# Patient Record
Sex: FEMALE | Race: WHITE | Employment: OTHER | URBAN - METROPOLITAN AREA
[De-identification: names, ages, dates, MRNs, and addresses within clinical notes are randomized per-mention and may not be internally consistent; named-entity substitution may affect disease eponyms.]

---

## 2018-05-03 ENCOUNTER — HOSPITAL ENCOUNTER (OUTPATIENT)
Dept: CARDIAC CATH/INVASIVE PROCEDURES | Age: 83
Discharge: HOME | End: 2018-05-04
Attending: INTERNAL MEDICINE | Admitting: INTERNAL MEDICINE
Payer: MEDICARE

## 2018-05-03 DIAGNOSIS — I25.10 CAD IN NATIVE ARTERY: ICD-10-CM

## 2018-05-03 PROBLEM — I10 HTN (HYPERTENSION), BENIGN: Chronic | Status: ACTIVE | Noted: 2018-05-03

## 2018-05-03 PROBLEM — J44.9 COPD (CHRONIC OBSTRUCTIVE PULMONARY DISEASE) (HCC): Chronic | Status: ACTIVE | Noted: 2018-05-03

## 2018-05-03 PROBLEM — E78.2 MIXED HYPERLIPIDEMIA: Chronic | Status: ACTIVE | Noted: 2018-05-03

## 2018-05-03 LAB
ABO/RH: NORMAL
ANTIBODY SCREEN: NORMAL

## 2018-05-03 PROCEDURE — 6360000002 HC RX W HCPCS

## 2018-05-03 PROCEDURE — 2709999900 HC NON-CHARGEABLE SUPPLY

## 2018-05-03 PROCEDURE — 2580000003 HC RX 258: Performed by: INTERNAL MEDICINE

## 2018-05-03 PROCEDURE — C1769 GUIDE WIRE: HCPCS

## 2018-05-03 PROCEDURE — 86901 BLOOD TYPING SEROLOGIC RH(D): CPT

## 2018-05-03 PROCEDURE — 2500000003 HC RX 250 WO HCPCS

## 2018-05-03 PROCEDURE — 36415 COLL VENOUS BLD VENIPUNCTURE: CPT

## 2018-05-03 PROCEDURE — 6370000000 HC RX 637 (ALT 250 FOR IP): Performed by: INTERNAL MEDICINE

## 2018-05-03 PROCEDURE — C1894 INTRO/SHEATH, NON-LASER: HCPCS

## 2018-05-03 PROCEDURE — 6370000000 HC RX 637 (ALT 250 FOR IP)

## 2018-05-03 PROCEDURE — C1874 STENT, COATED/COV W/DEL SYS: HCPCS

## 2018-05-03 PROCEDURE — C9600 PERC DRUG-EL COR STENT SING: HCPCS | Performed by: INTERNAL MEDICINE

## 2018-05-03 PROCEDURE — C1725 CATH, TRANSLUMIN NON-LASER: HCPCS

## 2018-05-03 PROCEDURE — 86900 BLOOD TYPING SEROLOGIC ABO: CPT

## 2018-05-03 PROCEDURE — 93458 L HRT ARTERY/VENTRICLE ANGIO: CPT | Performed by: INTERNAL MEDICINE

## 2018-05-03 PROCEDURE — 86850 RBC ANTIBODY SCREEN: CPT

## 2018-05-03 PROCEDURE — C1887 CATHETER, GUIDING: HCPCS

## 2018-05-03 RX ORDER — LEVOTHYROXINE SODIUM 88 UG/1
88 TABLET ORAL DAILY
COMMUNITY

## 2018-05-03 RX ORDER — SIMVASTATIN 20 MG
20 TABLET ORAL NIGHTLY
Status: ON HOLD | COMMUNITY
End: 2021-11-28

## 2018-05-03 RX ORDER — METOPROLOL SUCCINATE 25 MG/1
25 TABLET, EXTENDED RELEASE ORAL DAILY
Status: ON HOLD | COMMUNITY
End: 2022-07-20 | Stop reason: HOSPADM

## 2018-05-03 RX ORDER — CALCITRIOL 0.25 UG/1
0.25 CAPSULE, LIQUID FILLED ORAL DAILY
Status: DISCONTINUED | OUTPATIENT
Start: 2018-05-03 | End: 2018-05-04 | Stop reason: HOSPADM

## 2018-05-03 RX ORDER — METOPROLOL SUCCINATE 25 MG/1
25 TABLET, EXTENDED RELEASE ORAL 2 TIMES DAILY
Status: DISCONTINUED | OUTPATIENT
Start: 2018-05-03 | End: 2018-05-04 | Stop reason: HOSPADM

## 2018-05-03 RX ORDER — AMLODIPINE BESYLATE 5 MG/1
5 TABLET ORAL DAILY
COMMUNITY

## 2018-05-03 RX ORDER — SIMVASTATIN 20 MG
20 TABLET ORAL NIGHTLY
Status: DISCONTINUED | OUTPATIENT
Start: 2018-05-03 | End: 2018-05-04 | Stop reason: HOSPADM

## 2018-05-03 RX ORDER — ISOSORBIDE MONONITRATE 30 MG/1
30 TABLET, EXTENDED RELEASE ORAL DAILY
COMMUNITY

## 2018-05-03 RX ORDER — LORAZEPAM 0.5 MG/1
0.25 TABLET ORAL 2 TIMES DAILY
COMMUNITY

## 2018-05-03 RX ORDER — LORAZEPAM 0.5 MG/1
0.5 TABLET ORAL DAILY
Status: DISCONTINUED | OUTPATIENT
Start: 2018-05-03 | End: 2018-05-04 | Stop reason: HOSPADM

## 2018-05-03 RX ORDER — HYDRALAZINE HYDROCHLORIDE 20 MG/ML
10 INJECTION INTRAMUSCULAR; INTRAVENOUS EVERY 6 HOURS PRN
Status: DISCONTINUED | OUTPATIENT
Start: 2018-05-03 | End: 2018-05-04 | Stop reason: HOSPADM

## 2018-05-03 RX ORDER — CALCITRIOL 0.25 UG/1
0.25 CAPSULE, LIQUID FILLED ORAL DAILY
COMMUNITY

## 2018-05-03 RX ORDER — ASPIRIN 325 MG
325 TABLET ORAL DAILY
Status: DISCONTINUED | OUTPATIENT
Start: 2018-05-04 | End: 2018-05-04

## 2018-05-03 RX ORDER — ACETAMINOPHEN 325 MG/1
650 TABLET ORAL EVERY 4 HOURS PRN
Status: DISCONTINUED | OUTPATIENT
Start: 2018-05-03 | End: 2018-05-04 | Stop reason: HOSPADM

## 2018-05-03 RX ORDER — SODIUM CHLORIDE 0.9 % (FLUSH) 0.9 %
10 SYRINGE (ML) INJECTION EVERY 12 HOURS SCHEDULED
Status: DISCONTINUED | OUTPATIENT
Start: 2018-05-03 | End: 2018-05-04 | Stop reason: HOSPADM

## 2018-05-03 RX ORDER — GAUZE BANDAGE 4" X 4"
350 BANDAGE TOPICAL 3 TIMES DAILY
Status: DISCONTINUED | OUTPATIENT
Start: 2018-05-03 | End: 2018-05-03 | Stop reason: CLARIF

## 2018-05-03 RX ORDER — LOSARTAN POTASSIUM 50 MG/1
100 TABLET ORAL DAILY
Status: DISCONTINUED | OUTPATIENT
Start: 2018-05-04 | End: 2018-05-04 | Stop reason: HOSPADM

## 2018-05-03 RX ORDER — AMLODIPINE BESYLATE 5 MG/1
5 TABLET ORAL DAILY
Status: DISCONTINUED | OUTPATIENT
Start: 2018-05-04 | End: 2018-05-04 | Stop reason: HOSPADM

## 2018-05-03 RX ORDER — LOSARTAN POTASSIUM 100 MG/1
100 TABLET ORAL DAILY
Status: ON HOLD | COMMUNITY
End: 2022-07-20 | Stop reason: HOSPADM

## 2018-05-03 RX ORDER — SODIUM CHLORIDE 9 MG/ML
INJECTION, SOLUTION INTRAVENOUS ONCE
Status: COMPLETED | OUTPATIENT
Start: 2018-05-03 | End: 2018-05-03

## 2018-05-03 RX ORDER — MAGNESIUM OXIDE 400 MG/1
400 TABLET ORAL DAILY
COMMUNITY

## 2018-05-03 RX ORDER — ISOSORBIDE MONONITRATE 30 MG/1
30 TABLET, EXTENDED RELEASE ORAL DAILY
Status: DISCONTINUED | OUTPATIENT
Start: 2018-05-03 | End: 2018-05-04 | Stop reason: HOSPADM

## 2018-05-03 RX ORDER — M-VIT,TX,IRON,MINS/CALC/FOLIC 27MG-0.4MG
1 TABLET ORAL DAILY
COMMUNITY

## 2018-05-03 RX ORDER — ASPIRIN 325 MG
325 TABLET ORAL DAILY
Status: ON HOLD | COMMUNITY
End: 2018-05-04 | Stop reason: HOSPADM

## 2018-05-03 RX ORDER — CALCIUM CARBONATE 200(500)MG
1 TABLET,CHEWABLE ORAL 3 TIMES DAILY
Status: DISCONTINUED | OUTPATIENT
Start: 2018-05-03 | End: 2018-05-04 | Stop reason: HOSPADM

## 2018-05-03 RX ORDER — M-VIT,TX,IRON,MINS/CALC/FOLIC 27MG-0.4MG
1 TABLET ORAL DAILY
Status: DISCONTINUED | OUTPATIENT
Start: 2018-05-03 | End: 2018-05-04 | Stop reason: HOSPADM

## 2018-05-03 RX ORDER — PHENOL 1.4 %
1 AEROSOL, SPRAY (ML) MUCOUS MEMBRANE 3 TIMES DAILY
COMMUNITY

## 2018-05-03 RX ORDER — LEVOTHYROXINE SODIUM 88 UG/1
88 TABLET ORAL DAILY
Status: DISCONTINUED | OUTPATIENT
Start: 2018-05-04 | End: 2018-05-04 | Stop reason: HOSPADM

## 2018-05-03 RX ORDER — SODIUM CHLORIDE 0.9 % (FLUSH) 0.9 %
10 SYRINGE (ML) INJECTION PRN
Status: DISCONTINUED | OUTPATIENT
Start: 2018-05-03 | End: 2018-05-04 | Stop reason: HOSPADM

## 2018-05-03 RX ORDER — CITALOPRAM 20 MG/1
20 TABLET ORAL DAILY
COMMUNITY
End: 2018-05-03

## 2018-05-03 RX ORDER — GAUZE BANDAGE 4" X 4"
360 BANDAGE TOPICAL 3 TIMES DAILY
COMMUNITY

## 2018-05-03 RX ORDER — POTASSIUM CHLORIDE 1.5 G/1.77G
20 POWDER, FOR SOLUTION ORAL DAILY
Status: DISCONTINUED | OUTPATIENT
Start: 2018-05-03 | End: 2018-05-04 | Stop reason: HOSPADM

## 2018-05-03 RX ORDER — POTASSIUM CHLORIDE 1.5 G/1.77G
20 POWDER, FOR SOLUTION ORAL DAILY
Status: ON HOLD | COMMUNITY
End: 2021-11-29 | Stop reason: HOSPADM

## 2018-05-03 RX ORDER — OMEPRAZOLE 10 MG/1
10 CAPSULE, DELAYED RELEASE ORAL
Status: ON HOLD | COMMUNITY
End: 2021-11-28

## 2018-05-03 RX ORDER — LANOLIN ALCOHOL/MO/W.PET/CERES
400 CREAM (GRAM) TOPICAL DAILY
Status: DISCONTINUED | OUTPATIENT
Start: 2018-05-03 | End: 2018-05-04 | Stop reason: HOSPADM

## 2018-05-03 RX ADMIN — SODIUM CHLORIDE: 9 INJECTION, SOLUTION INTRAVENOUS at 10:15

## 2018-05-03 RX ADMIN — POTASSIUM CHLORIDE 20 MEQ: 1.5 POWDER, FOR SOLUTION ORAL at 15:41

## 2018-05-03 RX ADMIN — MAGNESIUM GLUCONATE 500 MG ORAL TABLET 400 MG: 500 TABLET ORAL at 15:41

## 2018-05-03 RX ADMIN — METOPROLOL SUCCINATE 25 MG: 25 TABLET, FILM COATED, EXTENDED RELEASE ORAL at 20:21

## 2018-05-03 RX ADMIN — LORAZEPAM 0.5 MG: 0.5 TABLET ORAL at 20:21

## 2018-05-03 RX ADMIN — CALCIUM CARBONATE (ANTACID) CHEW TAB 500 MG 500 MG: 500 CHEW TAB at 20:20

## 2018-05-03 RX ADMIN — Medication 10 ML: at 20:21

## 2018-05-03 RX ADMIN — ISOSORBIDE MONONITRATE 30 MG: 30 TABLET, EXTENDED RELEASE ORAL at 20:21

## 2018-05-03 RX ADMIN — SIMVASTATIN 20 MG: 20 TABLET, FILM COATED ORAL at 20:21

## 2018-05-03 RX ADMIN — TICAGRELOR 90 MG: 90 TABLET ORAL at 23:11

## 2018-05-03 RX ADMIN — CALCIUM CARBONATE (ANTACID) CHEW TAB 500 MG 500 MG: 500 CHEW TAB at 15:40

## 2018-05-03 RX ADMIN — CALCITRIOL 0.25 MCG: 0.25 CAPSULE, LIQUID FILLED ORAL at 15:41

## 2018-05-03 RX ADMIN — SODIUM CHLORIDE: 9 INJECTION, SOLUTION INTRAVENOUS at 10:14

## 2018-05-03 RX ADMIN — MULTIPLE VITAMINS W/ MINERALS TAB 1 TABLET: TAB at 15:41

## 2018-05-03 RX ADMIN — ACETAMINOPHEN 650 MG: 325 TABLET, FILM COATED ORAL at 17:23

## 2018-05-03 ASSESSMENT — PAIN SCALES - GENERAL
PAINLEVEL_OUTOF10: 5
PAINLEVEL_OUTOF10: 0
PAINLEVEL_OUTOF10: 1

## 2018-05-04 VITALS
RESPIRATION RATE: 16 BRPM | HEIGHT: 62 IN | TEMPERATURE: 98.3 F | HEART RATE: 71 BPM | WEIGHT: 147 LBS | BODY MASS INDEX: 27.05 KG/M2 | DIASTOLIC BLOOD PRESSURE: 86 MMHG | SYSTOLIC BLOOD PRESSURE: 125 MMHG | OXYGEN SATURATION: 96 %

## 2018-05-04 PROBLEM — Z95.5 STATUS POST INSERTION OF DRUG-ELUTING STENT INTO LEFT ANTERIOR DESCENDING (LAD) ARTERY: Status: ACTIVE | Noted: 2018-05-04

## 2018-05-04 LAB
ANION GAP SERPL CALCULATED.3IONS-SCNC: 16 MMOL/L (ref 7–16)
BUN BLDV-MCNC: 26 MG/DL (ref 8–23)
CALCIUM SERPL-MCNC: 8.8 MG/DL (ref 8.6–10.2)
CHLORIDE BLD-SCNC: 103 MMOL/L (ref 98–107)
CHOLESTEROL, TOTAL: 158 MG/DL (ref 0–199)
CO2: 23 MMOL/L (ref 22–29)
CREAT SERPL-MCNC: 0.9 MG/DL (ref 0.5–1)
GFR AFRICAN AMERICAN: >60
GFR NON-AFRICAN AMERICAN: 60 ML/MIN/1.73
GLUCOSE BLD-MCNC: 113 MG/DL (ref 74–109)
HCT VFR BLD CALC: 35.6 % (ref 34–48)
HDLC SERPL-MCNC: 60 MG/DL
HEMOGLOBIN: 12.1 G/DL (ref 11.5–15.5)
LDL CHOLESTEROL CALCULATED: 76 MG/DL (ref 0–99)
MCH RBC QN AUTO: 29.7 PG (ref 26–35)
MCHC RBC AUTO-ENTMCNC: 34 % (ref 32–34.5)
MCV RBC AUTO: 87.3 FL (ref 80–99.9)
PDW BLD-RTO: 13.8 FL (ref 11.5–15)
PLATELET # BLD: 228 E9/L (ref 130–450)
PMV BLD AUTO: 10.6 FL (ref 7–12)
POTASSIUM SERPL-SCNC: 4.1 MMOL/L (ref 3.5–5)
RBC # BLD: 4.08 E12/L (ref 3.5–5.5)
SODIUM BLD-SCNC: 142 MMOL/L (ref 132–146)
TRIGL SERPL-MCNC: 112 MG/DL (ref 0–149)
VLDLC SERPL CALC-MCNC: 22 MG/DL
WBC # BLD: 7.3 E9/L (ref 4.5–11.5)

## 2018-05-04 PROCEDURE — 80061 LIPID PANEL: CPT

## 2018-05-04 PROCEDURE — 6370000000 HC RX 637 (ALT 250 FOR IP)

## 2018-05-04 PROCEDURE — 36415 COLL VENOUS BLD VENIPUNCTURE: CPT

## 2018-05-04 PROCEDURE — 2580000003 HC RX 258: Performed by: INTERNAL MEDICINE

## 2018-05-04 PROCEDURE — 85027 COMPLETE CBC AUTOMATED: CPT

## 2018-05-04 PROCEDURE — 6370000000 HC RX 637 (ALT 250 FOR IP): Performed by: INTERNAL MEDICINE

## 2018-05-04 PROCEDURE — 80048 BASIC METABOLIC PNL TOTAL CA: CPT

## 2018-05-04 RX ORDER — ASPIRIN 81 MG/1
TABLET, CHEWABLE ORAL
Status: COMPLETED
Start: 2018-05-04 | End: 2018-05-04

## 2018-05-04 RX ORDER — ASPIRIN 81 MG/1
81 TABLET ORAL DAILY
Qty: 30 TABLET | Refills: 3 | Status: ON HOLD | OUTPATIENT
Start: 2018-05-04 | End: 2021-11-28

## 2018-05-04 RX ORDER — ASPIRIN 81 MG/1
81 TABLET ORAL DAILY
Status: DISCONTINUED | OUTPATIENT
Start: 2018-05-04 | End: 2018-05-04 | Stop reason: HOSPADM

## 2018-05-04 RX ADMIN — CALCITRIOL 0.25 MCG: 0.25 CAPSULE, LIQUID FILLED ORAL at 08:28

## 2018-05-04 RX ADMIN — MAGNESIUM GLUCONATE 500 MG ORAL TABLET 400 MG: 500 TABLET ORAL at 08:28

## 2018-05-04 RX ADMIN — LEVOTHYROXINE SODIUM 88 MCG: 88 TABLET ORAL at 06:31

## 2018-05-04 RX ADMIN — AMLODIPINE BESYLATE 5 MG: 5 TABLET ORAL at 08:27

## 2018-05-04 RX ADMIN — Medication 10 ML: at 08:28

## 2018-05-04 RX ADMIN — METOPROLOL SUCCINATE 25 MG: 25 TABLET, FILM COATED, EXTENDED RELEASE ORAL at 08:28

## 2018-05-04 RX ADMIN — TICAGRELOR 90 MG: 90 TABLET ORAL at 08:27

## 2018-05-04 RX ADMIN — ASPIRIN 81 MG 81 MG: 81 TABLET ORAL at 10:21

## 2018-05-04 RX ADMIN — ASPIRIN 81 MG: 81 TABLET, COATED ORAL at 10:22

## 2018-05-04 RX ADMIN — MULTIPLE VITAMINS W/ MINERALS TAB 1 TABLET: TAB at 08:27

## 2018-05-04 RX ADMIN — ISOSORBIDE MONONITRATE 30 MG: 30 TABLET, EXTENDED RELEASE ORAL at 08:27

## 2018-05-04 ASSESSMENT — PAIN SCALES - GENERAL
PAINLEVEL_OUTOF10: 0

## 2018-09-22 ENCOUNTER — APPOINTMENT (OUTPATIENT)
Dept: GENERAL RADIOLOGY | Age: 83
End: 2018-09-22
Payer: MEDICARE

## 2018-09-22 ENCOUNTER — HOSPITAL ENCOUNTER (EMERGENCY)
Age: 83
Discharge: HOME | End: 2018-09-22
Payer: MEDICARE

## 2018-09-22 VITALS
BODY MASS INDEX: 26.06 KG/M2 | OXYGEN SATURATION: 98 % | RESPIRATION RATE: 14 BRPM | HEART RATE: 82 BPM | TEMPERATURE: 98.4 F | WEIGHT: 138 LBS | DIASTOLIC BLOOD PRESSURE: 84 MMHG | SYSTOLIC BLOOD PRESSURE: 118 MMHG | HEIGHT: 61 IN

## 2018-09-22 DIAGNOSIS — M47.816 OSTEOARTHRITIS OF LUMBAR SPINE, UNSPECIFIED SPINAL OSTEOARTHRITIS COMPLICATION STATUS: Primary | ICD-10-CM

## 2018-09-22 DIAGNOSIS — M16.11 ARTHRITIS OF RIGHT HIP: ICD-10-CM

## 2018-09-22 PROCEDURE — 99283 EMERGENCY DEPT VISIT LOW MDM: CPT

## 2018-09-22 PROCEDURE — 6370000000 HC RX 637 (ALT 250 FOR IP): Performed by: PHYSICIAN ASSISTANT

## 2018-09-22 PROCEDURE — 72110 X-RAY EXAM L-2 SPINE 4/>VWS: CPT

## 2018-09-22 PROCEDURE — 73502 X-RAY EXAM HIP UNI 2-3 VIEWS: CPT

## 2018-09-22 RX ORDER — TIZANIDINE 4 MG/1
4 TABLET ORAL 2 TIMES DAILY PRN
Qty: 15 TABLET | Refills: 0 | Status: ON HOLD | OUTPATIENT
Start: 2018-09-22 | End: 2021-11-28

## 2018-09-22 RX ORDER — HYDROCODONE BITARTRATE AND ACETAMINOPHEN 5; 325 MG/1; MG/1
1 TABLET ORAL EVERY 6 HOURS PRN
Qty: 12 TABLET | Refills: 0 | Status: SHIPPED | OUTPATIENT
Start: 2018-09-22 | End: 2018-09-25

## 2018-09-22 RX ORDER — DIAZEPAM 5 MG/1
5 TABLET ORAL ONCE
Status: COMPLETED | OUTPATIENT
Start: 2018-09-22 | End: 2018-09-22

## 2018-09-22 RX ADMIN — DIAZEPAM 5 MG: 5 TABLET ORAL at 23:20

## 2018-09-22 ASSESSMENT — PAIN SCALES - GENERAL: PAINLEVEL_OUTOF10: 9

## 2018-09-22 ASSESSMENT — PAIN DESCRIPTION - LOCATION: LOCATION: LEG

## 2018-09-22 ASSESSMENT — PAIN DESCRIPTION - FREQUENCY: FREQUENCY: CONTINUOUS

## 2018-09-22 ASSESSMENT — PAIN DESCRIPTION - ORIENTATION: ORIENTATION: LEFT

## 2018-09-22 ASSESSMENT — PAIN DESCRIPTION - DESCRIPTORS: DESCRIPTORS: CONSTANT

## 2018-09-23 NOTE — ED PROVIDER NOTES
Independent:      HPI:  9/22/18, Time: 2046. Jefry Germain is a 80 y.o. female presenting to the ED for right hip and lower back pain worse over the last few days. She denies any recent falls, trauma or injury. The complaint has been persistent, moderate to severe in severity, and worsened by certain movements . She denies any falls, trauma or injury. She denies any loss or change of her bowel or bladder function. She has had no chest pain or SOB. ROS:   Pertinent positives and negatives are stated within the HPI, all other systems reviewed and are negative.    --------------------------------------------- PAST HISTORY ---------------------------------------------  Past Medical History:  has a past medical history of CAD (coronary artery disease); Cancer ([facility] [address] Emphysema lung ([facility] [address] Hyperlipidemia; Hypertension; and Thyroid disease. Past Surgical History:  has a past surgical history that includes Hysterectomy; Parathyroid gland surgery; Coronary angioplasty with stent (2004); and Coronary angioplasty with stent (05/03/2018). Social History:  reports that she has never smoked. She has never used smokeless tobacco.    Family History: family history is not on file. The patients home medications have been reviewed. Allergies: Patient has no known allergies. -------------------------------------------------- RESULTS -------------------------------------------------  All laboratory and radiology results have been personally reviewed by myself   LABS:  No results found for this visit on 09/22/18. RADIOLOGY:  Interpreted by Radiologist.  XR HIP RIGHT (2-3 VIEWS)   Final Result   Some degenerative changes around the right hip joint as   commented.       XR LUMBAR SPINE (MIN 4 VIEWS)   Final Result   Degenerative changes predominantly seen in the facet   joints of the lower lumbar spine segments of the L4-5 and L5-S1.          ------------------------- NURSING NOTES AND VITALS REVIEWED ---------------------------   The nursing notes within the ED encounter and vital signs as below have been reviewed. /84   Pulse 82   Temp 98.4 °F (36.9 °C)   Resp 14   Ht 5' 1\" (1.549 m)   Wt 138 lb (62.6 kg)   SpO2 98%   BMI 26.07 kg/m²   Oxygen Saturation Interpretation: Normal      ---------------------------------------------------PHYSICAL EXAM--------------------------------------      Constitutional/General: Alert and oriented x3, stable appearing, non toxic in NAD  Head: NC/AT  Eyes: PERRL, EOMI  Mouth: Oropharynx clear, handling secretions, no trismus  Neck: Supple, full ROM, non tender, no obvious spasm or stepoff. Pulmonary: Lungs clear to auscultation bilaterally, no wheezes, rales, or rhonchi. Not in respiratory distress  Cardiovascular:  Regular rate and rhythm,  2+ distal pulses  Abdomen: Soft, non tender, no palpable mass. BS active. No CVA tenderness, no flank hematoma. Extremities: Moves all extremities x 4. Negative SLR LE's bilaterally. Local tenderness to right lateral hip and right lower lumbar paravertebral area. Some palpable spasm. Warm and well perfused  Skin: warm and dry without rash  Neurologic: GCS 15  Psych: Normal Affect      ------------------------------ ED COURSE/MEDICAL DECISION MAKING----------------------  Medications   diazepam (VALIUM) tablet 5 mg (5 mg Oral Given 9/22/18 2320)       Medical Decision Making:    Patient to ER with complaints of right hip and lower back pain, worse over the last few days, no injury. Patient refused anything for pain on initial exam.  Patient awaiting xrays to be completed. Large volume in ER currently. On recheck, patient asking for something to drink, given glass of water. Patient still refusing med for pain. Patient agreed to something to help he muscles relax, dose of Valium given.     Patient advised of xray findings with DJD of lumbar spine with some facet joint disease as well as some arthritis of right hip.    Recommend Rx for Tizanidine as well as Norco for severe pain- hold tylenol if taking the Norco.    Need to see her PCP this week for possible physical therapy. She may need more advanced imaging if not improving. Counseling: The emergency provider has spoken with the patient and spouse/SO and discussed todays results, in addition to providing specific details for the plan of care and counseling regarding the diagnosis and prognosis. Questions are answered at this time and they are agreeable with the plan.      --------------------------------- IMPRESSION AND DISPOSITION ---------------------------------    IMPRESSION  1. Osteoarthritis of lumbar spine, unspecified spinal osteoarthritis complication status    2.  Arthritis of right hip        DISPOSITION  Disposition: Discharge to home  Patient condition is stable    Patient seen independently by Pavithra Longo PA-C  09/23/18 0601

## 2018-10-03 ENCOUNTER — HOSPITAL ENCOUNTER (EMERGENCY)
Age: 83
Discharge: HOME | End: 2018-10-04
Payer: MEDICARE

## 2018-10-03 ENCOUNTER — APPOINTMENT (OUTPATIENT)
Dept: CT IMAGING | Age: 83
End: 2018-10-03
Payer: MEDICARE

## 2018-10-03 DIAGNOSIS — S02.85XA CLOSED FRACTURE OF RIGHT ORBIT, INITIAL ENCOUNTER (HCC): ICD-10-CM

## 2018-10-03 DIAGNOSIS — S09.90XA INJURY OF HEAD, INITIAL ENCOUNTER: Primary | ICD-10-CM

## 2018-10-03 DIAGNOSIS — S16.1XXA STRAIN OF NECK MUSCLE, INITIAL ENCOUNTER: ICD-10-CM

## 2018-10-03 PROCEDURE — 99284 EMERGENCY DEPT VISIT MOD MDM: CPT

## 2018-10-03 PROCEDURE — 12011 RPR F/E/E/N/L/M 2.5 CM/<: CPT

## 2018-10-03 PROCEDURE — 72125 CT NECK SPINE W/O DYE: CPT

## 2018-10-03 PROCEDURE — 70450 CT HEAD/BRAIN W/O DYE: CPT

## 2018-10-03 PROCEDURE — 70486 CT MAXILLOFACIAL W/O DYE: CPT

## 2018-10-03 ASSESSMENT — PAIN DESCRIPTION - PAIN TYPE: TYPE: ACUTE PAIN

## 2018-10-03 ASSESSMENT — PAIN SCALES - GENERAL: PAINLEVEL_OUTOF10: 8

## 2018-10-03 ASSESSMENT — PAIN DESCRIPTION - LOCATION: LOCATION: HEAD

## 2018-10-04 VITALS
SYSTOLIC BLOOD PRESSURE: 179 MMHG | HEIGHT: 61 IN | HEART RATE: 82 BPM | RESPIRATION RATE: 20 BRPM | DIASTOLIC BLOOD PRESSURE: 76 MMHG | OXYGEN SATURATION: 96 % | TEMPERATURE: 98.5 F | BODY MASS INDEX: 26.24 KG/M2 | WEIGHT: 139 LBS

## 2018-10-04 PROCEDURE — 90471 IMMUNIZATION ADMIN: CPT | Performed by: PHYSICIAN ASSISTANT

## 2018-10-04 PROCEDURE — 6370000000 HC RX 637 (ALT 250 FOR IP): Performed by: PHYSICIAN ASSISTANT

## 2018-10-04 PROCEDURE — 90715 TDAP VACCINE 7 YRS/> IM: CPT | Performed by: PHYSICIAN ASSISTANT

## 2018-10-04 PROCEDURE — 6360000002 HC RX W HCPCS: Performed by: PHYSICIAN ASSISTANT

## 2018-10-04 RX ORDER — ACETAMINOPHEN 500 MG
1000 TABLET ORAL ONCE
Status: COMPLETED | OUTPATIENT
Start: 2018-10-04 | End: 2018-10-04

## 2018-10-04 RX ADMIN — TETANUS TOXOID, REDUCED DIPHTHERIA TOXOID AND ACELLULAR PERTUSSIS VACCINE, ADSORBED 0.5 ML: 5; 2.5; 8; 8; 2.5 SUSPENSION INTRAMUSCULAR at 01:43

## 2018-10-04 RX ADMIN — ACETAMINOPHEN 1000 MG: 500 TABLET ORAL at 01:43

## 2018-10-04 ASSESSMENT — VISUAL ACUITY
OS: 20/20
OU: 20/25
OD: 20/70

## 2018-10-04 ASSESSMENT — PAIN SCALES - GENERAL: PAINLEVEL_OUTOF10: 8

## 2018-10-04 NOTE — ED PROVIDER NOTES
Independent [facility]  HPI:  10/3/18, Time: 11:35 PM         Mary Jackson is a 80 y.o. female presenting to the ED for  Fall , beginning prior to arrival.  The complaint has been persistent, moderate in severity, and worsened by nothing. Patient comes in with complaint of fall. She gone downstairs on in the dark and lost her balance falling hitting her right eye on the corner of the handrail. She denies any loss of consciousness. She complains of pain around the right eye with blurring of vision. She presently on brilinta. She denies any neck pain no chest pain abdominal pain hip pain of her lower extremity pain. Review of Systems:   Pertinent positives and negatives are stated within HPI, all other systems reviewed and are negative.          --------------------------------------------- PAST HISTORY ---------------------------------------------  Past Medical History:  has a past medical history of CAD (coronary artery disease); Cancer ([facility] [address] Emphysema lung ([facility] [address] Hyperlipidemia; Hypertension; and Thyroid disease. Past Surgical History:  has a past surgical history that includes Hysterectomy; Parathyroid gland surgery; Coronary angioplasty with stent (2004); and Coronary angioplasty with stent (05/03/2018). Social History:  reports that she has never smoked. She has never used smokeless tobacco.    Family History: family history is not on file. The patients home medications have been reviewed. Allergies: Patient has no known allergies. -------------------------------------------------- RESULTS -------------------------------------------------  All laboratory and radiology results have been personally reviewed by myself   LABS:  No results found for this visit on 10/03/18. RADIOLOGY:  Interpreted by Radiologist.  CT Head WO Contrast   Final Result     No acute intracranial abnormality. Please refer to report for CT of the facial bones for additional details.       This report has been

## 2018-10-13 ENCOUNTER — HOSPITAL ENCOUNTER (EMERGENCY)
Age: 83
Discharge: LEFT BEFORE DISCHARGE | End: 2018-10-13
Attending: EMERGENCY MEDICINE
Payer: MEDICARE

## 2018-10-13 ENCOUNTER — APPOINTMENT (OUTPATIENT)
Dept: GENERAL RADIOLOGY | Age: 83
End: 2018-10-13
Payer: MEDICARE

## 2018-10-13 VITALS
WEIGHT: 138 LBS | HEART RATE: 65 BPM | RESPIRATION RATE: 14 BRPM | DIASTOLIC BLOOD PRESSURE: 57 MMHG | TEMPERATURE: 97.9 F | OXYGEN SATURATION: 98 % | SYSTOLIC BLOOD PRESSURE: 124 MMHG | HEIGHT: 61 IN | BODY MASS INDEX: 26.06 KG/M2

## 2018-10-13 DIAGNOSIS — R00.2 PALPITATIONS: Primary | ICD-10-CM

## 2018-10-13 DIAGNOSIS — R06.09 EXERTIONAL DYSPNEA: ICD-10-CM

## 2018-10-13 LAB
ANION GAP SERPL CALCULATED.3IONS-SCNC: 15 MMOL/L (ref 7–16)
BACTERIA: ABNORMAL /HPF
BASOPHILS ABSOLUTE: 0.06 E9/L (ref 0–0.2)
BASOPHILS RELATIVE PERCENT: 0.8 % (ref 0–2)
BILIRUBIN URINE: NEGATIVE
BLOOD, URINE: ABNORMAL
BUN BLDV-MCNC: 19 MG/DL (ref 8–23)
CALCIUM SERPL-MCNC: 9.4 MG/DL (ref 8.6–10.2)
CHLORIDE BLD-SCNC: 101 MMOL/L (ref 98–107)
CLARITY: CLEAR
CO2: 22 MMOL/L (ref 22–29)
COLOR: YELLOW
CREAT SERPL-MCNC: 0.9 MG/DL (ref 0.5–1)
EOSINOPHILS ABSOLUTE: 0.23 E9/L (ref 0.05–0.5)
EOSINOPHILS RELATIVE PERCENT: 3.2 % (ref 0–6)
GFR AFRICAN AMERICAN: >60
GFR NON-AFRICAN AMERICAN: 60 ML/MIN/1.73
GLUCOSE BLD-MCNC: 114 MG/DL (ref 74–109)
GLUCOSE URINE: NEGATIVE MG/DL
HCT VFR BLD CALC: 37.2 % (ref 34–48)
HEMOGLOBIN: 12.4 G/DL (ref 11.5–15.5)
IMMATURE GRANULOCYTES #: 0.02 E9/L
IMMATURE GRANULOCYTES %: 0.3 % (ref 0–5)
KETONES, URINE: NEGATIVE MG/DL
LEUKOCYTE ESTERASE, URINE: NEGATIVE
LYMPHOCYTES ABSOLUTE: 2.73 E9/L (ref 1.5–4)
LYMPHOCYTES RELATIVE PERCENT: 38 % (ref 20–42)
MAGNESIUM: 2.1 MG/DL (ref 1.6–2.6)
MCH RBC QN AUTO: 29.1 PG (ref 26–35)
MCHC RBC AUTO-ENTMCNC: 33.3 % (ref 32–34.5)
MCV RBC AUTO: 87.3 FL (ref 80–99.9)
MONOCYTES ABSOLUTE: 0.69 E9/L (ref 0.1–0.95)
MONOCYTES RELATIVE PERCENT: 9.6 % (ref 2–12)
NEUTROPHILS ABSOLUTE: 3.45 E9/L (ref 1.8–7.3)
NEUTROPHILS RELATIVE PERCENT: 48.1 % (ref 43–80)
NITRITE, URINE: NEGATIVE
PDW BLD-RTO: 13.4 FL (ref 11.5–15)
PH UA: 6 (ref 5–9)
PLATELET # BLD: 239 E9/L (ref 130–450)
PMV BLD AUTO: 11.2 FL (ref 7–12)
POTASSIUM SERPL-SCNC: 4.2 MMOL/L (ref 3.5–5)
PRO-BNP: 252 PG/ML (ref 0–450)
PROTEIN UA: NEGATIVE MG/DL
RBC # BLD: 4.26 E12/L (ref 3.5–5.5)
RBC UA: ABNORMAL /HPF (ref 0–2)
SODIUM BLD-SCNC: 138 MMOL/L (ref 132–146)
SPECIFIC GRAVITY UA: <=1.005 (ref 1–1.03)
TROPONIN: <0.01 NG/ML (ref 0–0.03)
UROBILINOGEN, URINE: 0.2 E.U./DL
WBC # BLD: 7.2 E9/L (ref 4.5–11.5)
WBC UA: ABNORMAL /HPF (ref 0–5)

## 2018-10-13 PROCEDURE — 83735 ASSAY OF MAGNESIUM: CPT

## 2018-10-13 PROCEDURE — 81001 URINALYSIS AUTO W/SCOPE: CPT

## 2018-10-13 PROCEDURE — 99285 EMERGENCY DEPT VISIT HI MDM: CPT

## 2018-10-13 PROCEDURE — 84484 ASSAY OF TROPONIN QUANT: CPT

## 2018-10-13 PROCEDURE — 80048 BASIC METABOLIC PNL TOTAL CA: CPT

## 2018-10-13 PROCEDURE — 93005 ELECTROCARDIOGRAM TRACING: CPT | Performed by: EMERGENCY MEDICINE

## 2018-10-13 PROCEDURE — 71045 X-RAY EXAM CHEST 1 VIEW: CPT

## 2018-10-13 PROCEDURE — 85025 COMPLETE CBC W/AUTO DIFF WBC: CPT

## 2018-10-13 PROCEDURE — 83880 ASSAY OF NATRIURETIC PEPTIDE: CPT

## 2018-10-13 ASSESSMENT — ENCOUNTER SYMPTOMS
VOMITING: 0
BACK PAIN: 0
SHORTNESS OF BREATH: 1
NAUSEA: 0
COUGH: 0
ABDOMINAL PAIN: 0
BLOOD IN STOOL: 0

## 2018-10-13 NOTE — ED NOTES
Pt states that she doesn't want to be admitted and wants to follow up with her doctor to have an outpatient stress test, Dr. Rohit Ludwig notified.      Page Mixon RN  10/13/18 1576

## 2018-10-13 NOTE — ED PROVIDER NOTES
Range    Magnesium 2.1 1.6 - 2.6 mg/dL   Urinalysis with Microscopic   Result Value Ref Range    Color, UA Yellow Straw/Yellow    Clarity, UA Clear Clear    Glucose, Ur Negative Negative mg/dL    Bilirubin Urine Negative Negative    Ketones, Urine Negative Negative mg/dL    Specific Gravity, UA <=1.005 1.005 - 1.030    Blood, Urine TRACE-INTACT Negative    pH, UA 6.0 5.0 - 9.0    Protein, UA Negative Negative mg/dL    Urobilinogen, Urine 0.2 <2.0 E.U./dL    Nitrite, Urine Negative Negative    Leukocyte Esterase, Urine Negative Negative    WBC, UA 0-1 0 - 5 /HPF    RBC, UA 0-1 0 - 2 /HPF    Bacteria, UA FEW (A) /HPF   EKG 12 Lead   Result Value Ref Range    Ventricular Rate 77 BPM    Atrial Rate 77 BPM    P-R Interval 90 ms    QRS Duration 64 ms    Q-T Interval 410 ms    QTc Calculation (Bazett) 463 ms    P Axis 28 degrees    R Axis 0 degrees    T Axis 82 degrees       Radiology:  XR CHEST PORTABLE   Final Result   No airspace opacities or pleural effusion.                ------------------------- NURSING NOTES AND VITALS REVIEWED ---------------------------  Date / Time Roomed:  10/13/2018  4:06 PM  ED Bed Assignment:  08/08    The nursing notes within the ED encounter and vital signs as below have been reviewed. BP (!) 124/57   Pulse 65   Temp 97.9 °F (36.6 °C) (Oral)   Resp 14   Ht 5' 1\" (1.549 m)   Wt 138 lb (62.6 kg)   SpO2 98%   BMI 26.07 kg/m²   Oxygen Saturation Interpretation: Normal        Discharge Medication List as of 10/13/2018  6:45 PM          Diagnosis:  1. Palpitations    2. Exertional dyspnea        Disposition:  Patient's disposition: Left AMA    Followed up with [address] [place] [address] on 10/13/2018. Patient left the ED with a disposition of AMA on 10/13/2018. Patient cited prefers outpatient workup as reason. Advised patient to follow up with a primary care physician or return to the Emergency Department if symptoms worsen.    Alexandru Hernandez, DO  Resident  10/13/18

## 2018-10-14 LAB
EKG ATRIAL RATE: 77 BPM
EKG P AXIS: 28 DEGREES
EKG P-R INTERVAL: 90 MS
EKG Q-T INTERVAL: 410 MS
EKG QRS DURATION: 64 MS
EKG QTC CALCULATION (BAZETT): 463 MS
EKG R AXIS: 0 DEGREES
EKG T AXIS: 82 DEGREES
EKG VENTRICULAR RATE: 77 BPM

## 2019-04-04 ENCOUNTER — HOSPITAL ENCOUNTER (EMERGENCY)
Age: 84
Discharge: HOME | End: 2019-04-04
Attending: EMERGENCY MEDICINE
Payer: MEDICARE

## 2019-04-04 ENCOUNTER — APPOINTMENT (OUTPATIENT)
Dept: GENERAL RADIOLOGY | Age: 84
End: 2019-04-04
Payer: MEDICARE

## 2019-04-04 VITALS
OXYGEN SATURATION: 91 % | HEART RATE: 98 BPM | DIASTOLIC BLOOD PRESSURE: 77 MMHG | SYSTOLIC BLOOD PRESSURE: 139 MMHG | HEIGHT: 62 IN | RESPIRATION RATE: 14 BRPM | BODY MASS INDEX: 26.5 KG/M2 | WEIGHT: 144 LBS | TEMPERATURE: 98.7 F

## 2019-04-04 DIAGNOSIS — J40 BRONCHITIS: Primary | ICD-10-CM

## 2019-04-04 LAB
ANION GAP SERPL CALCULATED.3IONS-SCNC: 14 MMOL/L (ref 7–16)
BASOPHILS ABSOLUTE: 0.05 E9/L (ref 0–0.2)
BASOPHILS RELATIVE PERCENT: 0.4 % (ref 0–2)
BUN BLDV-MCNC: 20 MG/DL (ref 8–23)
CALCIUM SERPL-MCNC: 9.1 MG/DL (ref 8.6–10.2)
CHLORIDE BLD-SCNC: 98 MMOL/L (ref 98–107)
CO2: 21 MMOL/L (ref 22–29)
CREAT SERPL-MCNC: 0.8 MG/DL (ref 0.5–1)
EOSINOPHILS ABSOLUTE: 0.05 E9/L (ref 0.05–0.5)
EOSINOPHILS RELATIVE PERCENT: 0.4 % (ref 0–6)
GFR AFRICAN AMERICAN: >60
GFR NON-AFRICAN AMERICAN: >60 ML/MIN/1.73
GLUCOSE BLD-MCNC: 167 MG/DL (ref 74–99)
HCT VFR BLD CALC: 37.2 % (ref 34–48)
HEMOGLOBIN: 12.4 G/DL (ref 11.5–15.5)
IMMATURE GRANULOCYTES #: 0.04 E9/L
IMMATURE GRANULOCYTES %: 0.3 % (ref 0–5)
INFLUENZA A BY PCR: NOT DETECTED
INFLUENZA B BY PCR: NOT DETECTED
LYMPHOCYTES ABSOLUTE: 1.61 E9/L (ref 1.5–4)
LYMPHOCYTES RELATIVE PERCENT: 13.8 % (ref 20–42)
MCH RBC QN AUTO: 29.2 PG (ref 26–35)
MCHC RBC AUTO-ENTMCNC: 33.3 % (ref 32–34.5)
MCV RBC AUTO: 87.5 FL (ref 80–99.9)
MONOCYTES ABSOLUTE: 1.07 E9/L (ref 0.1–0.95)
MONOCYTES RELATIVE PERCENT: 9.1 % (ref 2–12)
NEUTROPHILS ABSOLUTE: 8.88 E9/L (ref 1.8–7.3)
NEUTROPHILS RELATIVE PERCENT: 76 % (ref 43–80)
PDW BLD-RTO: 13.7 FL (ref 11.5–15)
PLATELET # BLD: 211 E9/L (ref 130–450)
PMV BLD AUTO: 10.4 FL (ref 7–12)
POTASSIUM SERPL-SCNC: 3.9 MMOL/L (ref 3.5–5)
PRO-BNP: 530 PG/ML (ref 0–450)
RBC # BLD: 4.25 E12/L (ref 3.5–5.5)
SODIUM BLD-SCNC: 133 MMOL/L (ref 132–146)
WBC # BLD: 11.7 E9/L (ref 4.5–11.5)

## 2019-04-04 PROCEDURE — 80048 BASIC METABOLIC PNL TOTAL CA: CPT

## 2019-04-04 PROCEDURE — 36415 COLL VENOUS BLD VENIPUNCTURE: CPT

## 2019-04-04 PROCEDURE — 85025 COMPLETE CBC W/AUTO DIFF WBC: CPT

## 2019-04-04 PROCEDURE — 99284 EMERGENCY DEPT VISIT MOD MDM: CPT

## 2019-04-04 PROCEDURE — 6370000000 HC RX 637 (ALT 250 FOR IP): Performed by: EMERGENCY MEDICINE

## 2019-04-04 PROCEDURE — 87502 INFLUENZA DNA AMP PROBE: CPT

## 2019-04-04 PROCEDURE — 96374 THER/PROPH/DIAG INJ IV PUSH: CPT

## 2019-04-04 PROCEDURE — 6360000002 HC RX W HCPCS: Performed by: EMERGENCY MEDICINE

## 2019-04-04 PROCEDURE — 83880 ASSAY OF NATRIURETIC PEPTIDE: CPT

## 2019-04-04 PROCEDURE — 71045 X-RAY EXAM CHEST 1 VIEW: CPT

## 2019-04-04 RX ORDER — METHYLPREDNISOLONE SODIUM SUCCINATE 125 MG/2ML
125 INJECTION, POWDER, LYOPHILIZED, FOR SOLUTION INTRAMUSCULAR; INTRAVENOUS ONCE
Status: COMPLETED | OUTPATIENT
Start: 2019-04-04 | End: 2019-04-04

## 2019-04-04 RX ORDER — ALBUTEROL SULFATE 90 UG/1
2 AEROSOL, METERED RESPIRATORY (INHALATION) EVERY 4 HOURS PRN
Qty: 1 INHALER | Refills: 1 | Status: ON HOLD | OUTPATIENT
Start: 2019-04-04 | End: 2021-11-28

## 2019-04-04 RX ORDER — AZITHROMYCIN 250 MG/1
250 TABLET, FILM COATED ORAL DAILY
Qty: 4 TABLET | Refills: 0 | Status: SHIPPED | OUTPATIENT
Start: 2019-04-04 | End: 2019-04-08

## 2019-04-04 RX ORDER — IPRATROPIUM BROMIDE AND ALBUTEROL SULFATE 2.5; .5 MG/3ML; MG/3ML
1 SOLUTION RESPIRATORY (INHALATION) ONCE
Status: COMPLETED | OUTPATIENT
Start: 2019-04-04 | End: 2019-04-04

## 2019-04-04 RX ORDER — PREDNISONE 50 MG/1
TABLET ORAL
Qty: 5 TABLET | Refills: 0 | Status: ON HOLD | OUTPATIENT
Start: 2019-04-04 | End: 2021-11-28

## 2019-04-04 RX ORDER — AZITHROMYCIN 250 MG/1
500 TABLET, FILM COATED ORAL ONCE
Status: COMPLETED | OUTPATIENT
Start: 2019-04-04 | End: 2019-04-04

## 2019-04-04 RX ADMIN — HYDROCODONE BITARTRATE AND HOMATROPINE METHYLBROMIDE 5 ML: 5; 1.5 SOLUTION ORAL at 01:49

## 2019-04-04 RX ADMIN — IPRATROPIUM BROMIDE AND ALBUTEROL SULFATE 1 AMPULE: .5; 3 SOLUTION RESPIRATORY (INHALATION) at 01:49

## 2019-04-04 RX ADMIN — AZITHROMYCIN 500 MG: 250 TABLET, FILM COATED ORAL at 03:15

## 2019-04-04 RX ADMIN — METHYLPREDNISOLONE SODIUM SUCCINATE 125 MG: 125 INJECTION, POWDER, FOR SOLUTION INTRAMUSCULAR; INTRAVENOUS at 01:49

## 2019-04-04 NOTE — ED PROVIDER NOTES
Department of Emergency Medicine   ED  Provider Note  Admit Date/RoomTime: 4/4/2019  1:23 AM  ED Room: 24/24          History of Present Illness:  4/4/19, Time: 1:52 AM         Danae Tinoco is a 80 y.o. female presenting to the ED for cough, beginning 3 days ago. The complaint has been persistent, moderate in severity, and worsened by nothing. History of emphysema lung. Pt reports having a cough over the past couple days. She states she has some nausea today as well. Pt denies any fever, chest pain, sob, abdominal pain, emesis, diarrhea, or any further complaints. Review of Systems:   Pertinent positives and negatives are stated within HPI, all other systems reviewed and are negative.      --------------------------------------------- PAST HISTORY ---------------------------------------------  Past Medical History:  has a past medical history of CAD (coronary artery disease), Cancer ([facility] [address] Emphysema lung ([facility] [address] Hyperlipidemia, Hypertension, and Thyroid disease. Past Surgical History:  has a past surgical history that includes Hysterectomy; Parathyroid gland surgery; Coronary angioplasty with stent (2004); and Coronary angioplasty with stent (05/03/2018). Social History:  reports that she has never smoked. She has never used smokeless tobacco. She reports that she does not drink alcohol or use drugs. Family History: family history is not on file. The patients home medications have been reviewed. Allergies: Patient has no known allergies.       ---------------------------------------------------PHYSICAL EXAM--------------------------------------    Constitutional/General: Alert and oriented x3, elderly, non toxic in NAD  Head: Normocephalic and atraumatic  Eyes: PERRL, EOMI, conjunctiva normal  Mouth: Oropharynx clear, handling secretions, no asymmetry of the posterior oropharynx or uvular edema  Neck: Supple, full ROM, non tender to palpation in the midline, no stridor, no crepitus, no meningeal signs  Respiratory: Expiratory rhonchi especially with cough. Not in respiratory distress  Cardiovascular:  Regular rate. Regular rhythm. No murmurs, gallops, or rubs. 2+ distal pulses  GI:  Abdomen Soft, Non tender, Non distended. +BS. No rebound, guarding, or rigidity. No pulsatile masses. Musculoskeletal: Moves all extremities x 4. Warm and well perfused  Integument: skin warm and dry. No rashes. Neurologic: GCS 15, no focal deficits  Psychiatric: Normal Affect      -------------------------------------------------- RESULTS -------------------------------------------------  I have personally reviewed all laboratory and imaging results for this patient. Results are listed below.      LABS:  Results for orders placed or performed during the hospital encounter of 04/04/19   Rapid influenza A/B antigens   Result Value Ref Range    Influenza A by PCR Not Detected Not Detected    Influenza B by PCR Not Detected Not Detected   CBC Auto Differential   Result Value Ref Range    WBC 11.7 (H) 4.5 - 11.5 E9/L    RBC 4.25 3.50 - 5.50 E12/L    Hemoglobin 12.4 11.5 - 15.5 g/dL    Hematocrit 37.2 34.0 - 48.0 %    MCV 87.5 80.0 - 99.9 fL    MCH 29.2 26.0 - 35.0 pg    MCHC 33.3 32.0 - 34.5 %    RDW 13.7 11.5 - 15.0 fL    Platelets 355 323 - 532 E9/L    MPV 10.4 7.0 - 12.0 fL    Neutrophils % 76.0 43.0 - 80.0 %    Immature Granulocytes % 0.3 0.0 - 5.0 %    Lymphocytes % 13.8 (L) 20.0 - 42.0 %    Monocytes % 9.1 2.0 - 12.0 %    Eosinophils % 0.4 0.0 - 6.0 %    Basophils % 0.4 0.0 - 2.0 %    Neutrophils # 8.88 (H) 1.80 - 7.30 E9/L    Immature Granulocytes # 0.04 E9/L    Lymphocytes # 1.61 1.50 - 4.00 E9/L    Monocytes # 1.07 (H) 0.10 - 0.95 E9/L    Eosinophils # 0.05 0.05 - 0.50 E9/L    Basophils # 0.05 0.00 - 0.20 Q2/M   Basic Metabolic Panel   Result Value Ref Range    Sodium 133 132 - 146 mmol/L    Potassium 3.9 3.5 - 5.0 mmol/L    Chloride 98 98 - 107 mmol/L    CO2 21 (L) 22 - 29 mmol/L    Anion Gap 14 7 - 16 mmol/L    Glucose 167 (H) 74 - 99 mg/dL    BUN 20 8 - 23 mg/dL    CREATININE 0.8 0.5 - 1.0 mg/dL    GFR Non-African American >60 >=60 mL/min/1.73    GFR African American >60     Calcium 9.1 8.6 - 10.2 mg/dL   Brain Natriuretic Peptide   Result Value Ref Range    Pro- (H) 0 - 450 pg/mL       RADIOLOGY:  Interpreted by Radiologist.  XR CHEST PORTABLE    (Results Pending)       ------------------------- NURSING NOTES AND VITALS REVIEWED ---------------------------   The nursing notes within the ED encounter and vital signs as below have been reviewed by myself. /77   Pulse 98   Temp 98.7 °F (37.1 °C) (Oral)   Resp 14   Ht 5' 2\" (1.575 m)   Wt 144 lb (65.3 kg)   SpO2 91%   BMI 26.34 kg/m²   Oxygen Saturation Interpretation: Normal    The patients available past medical records and past encounters were reviewed. ------------------------------ ED COURSE/MEDICAL DECISION MAKING----------------------  Medications   azithromycin (ZITHROMAX) tablet 500 mg (has no administration in time range)   ipratropium-albuterol (DUONEB) nebulizer solution 1 ampule (1 ampule Nebulization Given 4/4/19 0149)   methylPREDNISolone sodium (SOLU-MEDROL) injection 125 mg (125 mg Intravenous Given 4/4/19 0149)   HYDROcodone-homatropine (HYCODAN) 5-1.5 MG/5ML syrup 5 mL (5 mLs Oral Given 4/4/19 0149)            Medical Decision Making:    Pt presents for cough. Pt evaluated. DuoNeb breathing treatments and medication given. Imaging and labs obtained and reviewed. This patient's ED course included: a personal history and physicial examination and re-evaluation prior to disposition    This patient has remained hemodynamically stable and been closely monitored during their ED course. Re-Evaluations:           Re-evaluation. Patients symptoms are improving    Consultations:             Na      Counseling:    The emergency provider has spoken with the patient and discussed todays results, in addition to providing specific details for the plan of care and counseling regarding the diagnosis and prognosis. Questions are answered at this time and they are agreeable with the plan.       --------------------------------- IMPRESSION AND DISPOSITION ---------------------------------    IMPRESSION  1. Bronchitis Stable       DISPOSITION  Disposition: Discharge to home  Patient condition is stable    4/4/19, 1:52 AM.    This note is prepared by Lara Valenzuela, acting as Scribe for Tomas Blankenship MD.    Tomas Blankenship MD:  The scribe's documentation has been prepared under my direction and personally reviewed by me in its entirety. I confirm that the note above accurately reflects all work, treatment, procedures, and medical decision making performed by me.              Tomas Blankenship MD  04/04/19 8516

## 2019-08-15 ENCOUNTER — OFFICE VISIT (OUTPATIENT)
Dept: ORTHOPEDIC SURGERY | Age: 84
End: 2019-08-15
Payer: MEDICARE

## 2019-08-15 VITALS
HEIGHT: 62 IN | WEIGHT: 145 LBS | TEMPERATURE: 96.7 F | SYSTOLIC BLOOD PRESSURE: 136 MMHG | BODY MASS INDEX: 26.68 KG/M2 | DIASTOLIC BLOOD PRESSURE: 74 MMHG | HEART RATE: 67 BPM

## 2019-08-15 DIAGNOSIS — M17.12 PRIMARY OSTEOARTHRITIS OF LEFT KNEE: ICD-10-CM

## 2019-08-15 DIAGNOSIS — M25.562 LEFT KNEE PAIN, UNSPECIFIED CHRONICITY: Primary | ICD-10-CM

## 2019-08-15 PROCEDURE — 4040F PNEUMOC VAC/ADMIN/RCVD: CPT | Performed by: ORTHOPAEDIC SURGERY

## 2019-08-15 PROCEDURE — 99203 OFFICE O/P NEW LOW 30 MIN: CPT | Performed by: ORTHOPAEDIC SURGERY

## 2019-08-15 PROCEDURE — G8400 PT W/DXA NO RESULTS DOC: HCPCS | Performed by: ORTHOPAEDIC SURGERY

## 2019-08-15 PROCEDURE — 1036F TOBACCO NON-USER: CPT | Performed by: ORTHOPAEDIC SURGERY

## 2019-08-15 PROCEDURE — G8427 DOCREV CUR MEDS BY ELIG CLIN: HCPCS | Performed by: ORTHOPAEDIC SURGERY

## 2019-08-15 PROCEDURE — G8598 ASA/ANTIPLAT THER USED: HCPCS | Performed by: ORTHOPAEDIC SURGERY

## 2019-08-15 PROCEDURE — G8419 CALC BMI OUT NRM PARAM NOF/U: HCPCS | Performed by: ORTHOPAEDIC SURGERY

## 2019-08-15 PROCEDURE — 1090F PRES/ABSN URINE INCON ASSESS: CPT | Performed by: ORTHOPAEDIC SURGERY

## 2019-08-15 PROCEDURE — 1123F ACP DISCUSS/DSCN MKR DOCD: CPT | Performed by: ORTHOPAEDIC SURGERY

## 2019-08-15 RX ORDER — CLOPIDOGREL BISULFATE 75 MG/1
75 TABLET ORAL DAILY
COMMUNITY
Start: 2019-07-30

## 2019-08-15 RX ORDER — PANTOPRAZOLE SODIUM 40 MG/1
40 TABLET, DELAYED RELEASE ORAL DAILY
COMMUNITY
Start: 2019-08-12

## 2019-08-15 RX ORDER — MELOXICAM 7.5 MG/1
7.5 TABLET ORAL DAILY
Qty: 30 TABLET | Refills: 0 | Status: SHIPPED | OUTPATIENT
Start: 2019-08-15 | End: 2019-09-09 | Stop reason: SDUPTHER

## 2019-08-15 RX ORDER — ACETAMINOPHEN 500 MG
500 TABLET ORAL EVERY 6 HOURS PRN
COMMUNITY

## 2019-08-15 RX ORDER — ROSUVASTATIN CALCIUM 20 MG/1
20 TABLET, COATED ORAL DAILY
COMMUNITY
Start: 2019-06-26

## 2019-08-15 RX ORDER — CITALOPRAM 20 MG/1
10 TABLET ORAL DAILY
COMMUNITY
Start: 2019-07-22

## 2019-08-15 NOTE — PROGRESS NOTES
Chief Complaint:   Chief Complaint   Patient presents with    Knee Pain     Lt knee pain since May 2019. Was out walking, came home, sat down, went to get back up when she twisted Lt knee. Has had pain since. In PT now which does not seem to helping. No X-rays       HPI     Gal Brown is a 80 y.o. female, who presents with a 3-month history left knee pain acute onset after minor twisting mechanism. Denies previous problems with his knee no other joint complaints. Had injection by her PCP with significant but temporary relief, has been attending appropriate physical therapy without improvement yet. No fever chills sweats other systemic symptoms no other joint complaints. Rarely takes Tylenol for pain. Has not taken any anti-inflammatories being advised to avoid them due to history of cardiac disease. She is otherwise active and independent in the community lives with her  in a private home. Allergies; medications; past medical, surgical, family, and social history; and problem list have been reviewed today and updated as indicated in this encounter - see below following Ortho specifics. Musculoskeletal: Upper extremities grossly intact leg lengths equal hip motion painless. Both knee exams are straight and stable to medial lateral laxity, medial joint line is tender to palpation on the left with local synovitis but no effusion. Range of motion normal no crepitus felt. Radiologic Studies: Weightbearing AP x-rays of the knees including lateral left were obtained today, there is moderate medial narrowing in the left knee with loss of joint space early subchondral sclerosis consistent with moderate varus DJD left knee. Possible stable mild collapse of the medial femoral condyle on the left towards the intercondylar notch. Possibly consistent with osteonecrosis. ASSESSMENT/PLAN:    Eliseo Hightower was seen today for knee pain.     Diagnoses and all orders for this visit:    Left knee Medication Sig Dispense Refill    citalopram (CELEXA) 20 MG tablet Take 20 mg by mouth daily Patient takes 1/2 tab daily      clopidogrel (PLAVIX) 75 MG tablet Take 75 mg by mouth daily       rosuvastatin (CRESTOR) 20 MG tablet Take 20 mg by mouth daily       pantoprazole (PROTONIX) 40 MG tablet Take 40 mg by mouth daily       acetaminophen (TYLENOL) 500 MG tablet Take 500 mg by mouth every 6 hours as needed for Pain      meloxicam (MOBIC) 7.5 MG tablet Take 1 tablet by mouth daily 30 tablet 0    aspirin 81 MG EC tablet Take 1 tablet by mouth daily 30 tablet 3    amLODIPine (NORVASC) 5 MG tablet Take 5 mg by mouth daily      potassium chloride (KLOR-CON) 20 MEQ packet Take 20 mEq by mouth daily      metoprolol succinate (TOPROL XL) 25 MG extended release tablet Take 25 mg by mouth 2 times daily      calcitRIOL (ROCALTROL) 0.25 MCG capsule Take 0.25 mcg by mouth daily      isosorbide mononitrate (IMDUR) 30 MG extended release tablet Take 30 mg by mouth daily      losartan (COZAAR) 100 MG tablet Take 100 mg by mouth daily      levothyroxine (SYNTHROID) 88 MCG tablet Take 88 mcg by mouth Daily      magnesium oxide (MAG-OX) 400 MG tablet Take 400 mg by mouth daily      Multiple Vitamins-Minerals (THERAPEUTIC MULTIVITAMIN-MINERALS) tablet Take 1 tablet by mouth daily      calcium carbonate 600 MG TABS tablet Take 1 tablet by mouth 3 times daily      Omega-3 Fatty Acids (FISH OIL) 435 MG CAPS Take 350 mg by mouth three times daily      predniSONE (DELTASONE) 50 MG tablet Take 50mg po qd x 5 days  QS for 5 days (Patient not taking: Reported on 8/15/2019) 5 tablet 0    albuterol sulfate HFA (PROVENTIL HFA) 108 (90 Base) MCG/ACT inhaler Inhale 2 puffs into the lungs every 4 hours as needed for Wheezing (Patient not taking: Reported on 8/15/2019) 1 Inhaler 1    tiZANidine (ZANAFLEX) 4 MG tablet Take 1 tablet by mouth 2 times daily as needed (to help muscles relax) (Patient not taking: Reported on 8/15/2019) 15 tablet 0    ticagrelor (BRILINTA) 90 MG TABS tablet Take 1 tablet by mouth 2 times daily (Patient not taking: Reported on 8/15/2019) 60 tablet 0    simvastatin (ZOCOR) 20 MG tablet Take 20 mg by mouth nightly      LORazepam (ATIVAN) 0.5 MG tablet Take 0.5 mg by mouth daily. Holger Fierro omeprazole (PRILOSEC) 10 MG delayed release capsule Take 10 mg by mouth Five times weekly       No current facility-administered medications for this visit. No Known Allergies    Social History     Socioeconomic History    Marital status:      Spouse name: None    Number of children: None    Years of education: None    Highest education level: None   Occupational History    None   Social Needs    Financial resource strain: None    Food insecurity:     Worry: None     Inability: None    Transportation needs:     Medical: None     Non-medical: None   Tobacco Use    Smoking status: Never Smoker    Smokeless tobacco: Never Used   Substance and Sexual Activity    Alcohol use: No    Drug use: No    Sexual activity: None   Lifestyle    Physical activity:     Days per week: None     Minutes per session: None    Stress: None   Relationships    Social connections:     Talks on phone: None     Gets together: None     Attends [employer] service: None     Active member of club or organization: None     Attends meetings of clubs or organizations: None     Relationship status: None    Intimate partner violence:     Fear of current or ex partner: None     Emotionally abused: None     Physically abused: None     Forced sexual activity: None   Other Topics Concern    None   Social History Narrative    None       History reviewed. No pertinent family history. Review of Systems  As follows except as previously noted in HPI:  Constitutional: Negative for chills, diaphoresis, fatigue, fever and unexpected weight change. Respiratory: Negative for cough, shortness of breath and wheezing.     Cardiovascular: Negative for chest pain and palpitations. Neurological: Negative for dizziness, syncope, cephalgia. GI / : negative  Musculoskeletal: see HPI       Objective:   Physical Exam   Constitutional: Oriented to person, place, and time. and appears well-developed and well-nourished. :   Head: Normocephalic and atraumatic. Eyes: EOM are normal.   Neck: Neck supple. Cardiovascular: Normal rate and regular rhythm. Pulmonary/Chest: Effort normal. No stridor. No respiratory distress, no wheezes. Abdominal:  No abnormal distension. Neurological: Alert and oriented to person, place, and time. Skin: Skin is warm and dry. Psychiatric: Normal mood and affect.  Behavior is normal. Thought content normal.

## 2019-09-09 RX ORDER — MELOXICAM 7.5 MG/1
7.5 TABLET ORAL DAILY
Qty: 30 TABLET | Refills: 0 | Status: ON HOLD
Start: 2019-09-09 | End: 2021-11-28

## 2019-09-09 NOTE — TELEPHONE ENCOUNTER
9/07/2019 9:07 am Received a computer generated fax from [id number]  [phone]: A request to refill Meloxicam 7.5 mg tablet (30 tablets / 0 refills). Sig. Take one tablet by mouth every day.   Order pended

## 2019-09-17 ENCOUNTER — OFFICE VISIT (OUTPATIENT)
Dept: ORTHOPEDIC SURGERY | Age: 84
End: 2019-09-17
Payer: MEDICARE

## 2019-09-17 VITALS
HEIGHT: 62 IN | TEMPERATURE: 96.9 F | WEIGHT: 145 LBS | HEART RATE: 62 BPM | DIASTOLIC BLOOD PRESSURE: 65 MMHG | SYSTOLIC BLOOD PRESSURE: 143 MMHG | BODY MASS INDEX: 26.68 KG/M2

## 2019-09-17 DIAGNOSIS — M17.12 PRIMARY OSTEOARTHRITIS OF LEFT KNEE: Primary | ICD-10-CM

## 2019-09-17 PROCEDURE — G8419 CALC BMI OUT NRM PARAM NOF/U: HCPCS | Performed by: ORTHOPAEDIC SURGERY

## 2019-09-17 PROCEDURE — 4040F PNEUMOC VAC/ADMIN/RCVD: CPT | Performed by: ORTHOPAEDIC SURGERY

## 2019-09-17 PROCEDURE — 1036F TOBACCO NON-USER: CPT | Performed by: ORTHOPAEDIC SURGERY

## 2019-09-17 PROCEDURE — G8598 ASA/ANTIPLAT THER USED: HCPCS | Performed by: ORTHOPAEDIC SURGERY

## 2019-09-17 PROCEDURE — G8427 DOCREV CUR MEDS BY ELIG CLIN: HCPCS | Performed by: ORTHOPAEDIC SURGERY

## 2019-09-17 PROCEDURE — 99214 OFFICE O/P EST MOD 30 MIN: CPT | Performed by: ORTHOPAEDIC SURGERY

## 2019-09-17 PROCEDURE — 1123F ACP DISCUSS/DSCN MKR DOCD: CPT | Performed by: ORTHOPAEDIC SURGERY

## 2019-09-17 PROCEDURE — 1090F PRES/ABSN URINE INCON ASSESS: CPT | Performed by: ORTHOPAEDIC SURGERY

## 2019-09-17 PROCEDURE — G8400 PT W/DXA NO RESULTS DOC: HCPCS | Performed by: ORTHOPAEDIC SURGERY

## 2019-09-18 NOTE — PROGRESS NOTES
Chief Complaint:   Chief Complaint   Patient presents with    Knee Pain     FU Left knee pain. PT has helped with walking. She still has pain medial left knee with steps, stooping, in and out of car. Occasional buckling of left knee       Taylor Prakash presents with persisting but somewhat improved left knee pain following both use of meloxicam as well as some physical therapy, she is walking better pain is not constant but is aggravated with activities, continues to be located at the medial side of the knee only. No new injury no other joint complaints, tolerating the Mobic without evidence side effect. States that her pain is not bad enough to consider injections or any surgical intervention at this time. Allergies; medications; past medical, surgical, family, and social history; and problem list have been reviewed today and updated as indicated in this encounter seen below. Exam: Leg lengths equal hip motion painless, right knee benign. Right knee tender to palpation over the medial femoral condyle and medial joint line although medial lateral and AP stability are good. Full range of motion mild retropatellar crepitus. Negative Julio's. Radiographs: Review of previous x-rays today show mild to moderate medial narrowing of the left knee that is isolated with mild varus deformity consistent with medial compartment DJD not end-stage. Corey Valdovinos was seen today for knee pain. Diagnoses and all orders for this visit:    Primary osteoarthritis of left knee       Treatment options were again reviewed, given the patient's age and early degenerative disease if she were to consider surgery in my opinion it would take the form of medial partial knee replacement. She does not feel her symptoms are sufficient to justify that magnitude of intervention at this time. I would hope to agree.   Nevertheless she will continue with low impact exercises as she learned in therapy, take meloxicam if she

## 2021-11-27 ENCOUNTER — APPOINTMENT (OUTPATIENT)
Dept: GENERAL RADIOLOGY | Age: 86
DRG: 558 | End: 2021-11-27
Payer: MEDICARE

## 2021-11-27 ENCOUNTER — HOSPITAL ENCOUNTER (INPATIENT)
Age: 86
LOS: 1 days | Discharge: HOME HEALTH | DRG: 558 | End: 2021-11-29
Attending: STUDENT IN AN ORGANIZED HEALTH CARE EDUCATION/TRAINING PROGRAM | Admitting: INTERNAL MEDICINE
Payer: MEDICARE

## 2021-11-27 ENCOUNTER — APPOINTMENT (OUTPATIENT)
Dept: CT IMAGING | Age: 86
DRG: 558 | End: 2021-11-27
Payer: MEDICARE

## 2021-11-27 DIAGNOSIS — R26.2 INABILITY TO AMBULATE DUE TO HIP: ICD-10-CM

## 2021-11-27 DIAGNOSIS — M25.552 LEFT HIP PAIN: Primary | ICD-10-CM

## 2021-11-27 PROCEDURE — 73502 X-RAY EXAM HIP UNI 2-3 VIEWS: CPT

## 2021-11-27 PROCEDURE — 6370000000 HC RX 637 (ALT 250 FOR IP): Performed by: STUDENT IN AN ORGANIZED HEALTH CARE EDUCATION/TRAINING PROGRAM

## 2021-11-27 PROCEDURE — 72100 X-RAY EXAM L-S SPINE 2/3 VWS: CPT

## 2021-11-27 PROCEDURE — 72192 CT PELVIS W/O DYE: CPT

## 2021-11-27 PROCEDURE — 72170 X-RAY EXAM OF PELVIS: CPT

## 2021-11-27 PROCEDURE — 72125 CT NECK SPINE W/O DYE: CPT

## 2021-11-27 PROCEDURE — 70450 CT HEAD/BRAIN W/O DYE: CPT

## 2021-11-27 PROCEDURE — 99284 EMERGENCY DEPT VISIT MOD MDM: CPT

## 2021-11-27 RX ORDER — ACETAMINOPHEN 325 MG/1
650 TABLET ORAL ONCE
Status: COMPLETED | OUTPATIENT
Start: 2021-11-27 | End: 2021-11-27

## 2021-11-27 RX ADMIN — ACETAMINOPHEN 650 MG: 325 TABLET ORAL at 19:31

## 2021-11-27 ASSESSMENT — PAIN SCALES - GENERAL
PAINLEVEL_OUTOF10: 9
PAINLEVEL_OUTOF10: 0

## 2021-11-27 ASSESSMENT — PAIN DESCRIPTION - PAIN TYPE: TYPE: ACUTE PAIN

## 2021-11-27 ASSESSMENT — PAIN DESCRIPTION - LOCATION: LOCATION: HIP

## 2021-11-27 ASSESSMENT — PAIN DESCRIPTION - ORIENTATION: ORIENTATION: LEFT

## 2021-11-28 PROBLEM — R26.2 INABILITY TO AMBULATE DUE TO HIP: Status: ACTIVE | Noted: 2021-11-28

## 2021-11-28 LAB
ALBUMIN SERPL-MCNC: 4.4 G/DL (ref 3.5–5.2)
ALP BLD-CCNC: 71 U/L (ref 35–104)
ALT SERPL-CCNC: 12 U/L (ref 0–32)
ANION GAP SERPL CALCULATED.3IONS-SCNC: 15 MMOL/L (ref 7–16)
AST SERPL-CCNC: 18 U/L (ref 0–31)
BASOPHILS ABSOLUTE: 0.06 E9/L (ref 0–0.2)
BASOPHILS RELATIVE PERCENT: 0.6 % (ref 0–2)
BILIRUB SERPL-MCNC: 0.7 MG/DL (ref 0–1.2)
BUN BLDV-MCNC: 16 MG/DL (ref 6–23)
CALCIUM SERPL-MCNC: 8.7 MG/DL (ref 8.6–10.2)
CHLORIDE BLD-SCNC: 104 MMOL/L (ref 98–107)
CO2: 21 MMOL/L (ref 22–29)
CREAT SERPL-MCNC: 0.7 MG/DL (ref 0.5–1)
EOSINOPHILS ABSOLUTE: 0.22 E9/L (ref 0.05–0.5)
EOSINOPHILS RELATIVE PERCENT: 2 % (ref 0–6)
GFR AFRICAN AMERICAN: >60
GFR NON-AFRICAN AMERICAN: >60 ML/MIN/1.73
GLUCOSE BLD-MCNC: 123 MG/DL (ref 74–99)
HCT VFR BLD CALC: 40.6 % (ref 34–48)
HEMOGLOBIN: 13.5 G/DL (ref 11.5–15.5)
IMMATURE GRANULOCYTES #: 0.03 E9/L
IMMATURE GRANULOCYTES %: 0.3 % (ref 0–5)
LYMPHOCYTES ABSOLUTE: 2.18 E9/L (ref 1.5–4)
LYMPHOCYTES RELATIVE PERCENT: 20.2 % (ref 20–42)
MCH RBC QN AUTO: 28.5 PG (ref 26–35)
MCHC RBC AUTO-ENTMCNC: 33.3 % (ref 32–34.5)
MCV RBC AUTO: 85.7 FL (ref 80–99.9)
MONOCYTES ABSOLUTE: 1.24 E9/L (ref 0.1–0.95)
MONOCYTES RELATIVE PERCENT: 11.5 % (ref 2–12)
NEUTROPHILS ABSOLUTE: 7.08 E9/L (ref 1.8–7.3)
NEUTROPHILS RELATIVE PERCENT: 65.4 % (ref 43–80)
PDW BLD-RTO: 13.2 FL (ref 11.5–15)
PLATELET # BLD: 274 E9/L (ref 130–450)
PMV BLD AUTO: 10.4 FL (ref 7–12)
POTASSIUM REFLEX MAGNESIUM: 3.7 MMOL/L (ref 3.5–5)
RBC # BLD: 4.74 E12/L (ref 3.5–5.5)
SODIUM BLD-SCNC: 140 MMOL/L (ref 132–146)
TOTAL PROTEIN: 7.6 G/DL (ref 6.4–8.3)
WBC # BLD: 10.8 E9/L (ref 4.5–11.5)

## 2021-11-28 PROCEDURE — 97530 THERAPEUTIC ACTIVITIES: CPT

## 2021-11-28 PROCEDURE — 36415 COLL VENOUS BLD VENIPUNCTURE: CPT

## 2021-11-28 PROCEDURE — 80053 COMPREHEN METABOLIC PANEL: CPT

## 2021-11-28 PROCEDURE — 6360000002 HC RX W HCPCS: Performed by: INTERNAL MEDICINE

## 2021-11-28 PROCEDURE — 2580000003 HC RX 258: Performed by: INTERNAL MEDICINE

## 2021-11-28 PROCEDURE — 1200000000 HC SEMI PRIVATE

## 2021-11-28 PROCEDURE — 6370000000 HC RX 637 (ALT 250 FOR IP): Performed by: INTERNAL MEDICINE

## 2021-11-28 PROCEDURE — 97161 PT EVAL LOW COMPLEX 20 MIN: CPT

## 2021-11-28 PROCEDURE — 85025 COMPLETE CBC W/AUTO DIFF WBC: CPT

## 2021-11-28 RX ORDER — METOPROLOL SUCCINATE 25 MG/1
25 TABLET, EXTENDED RELEASE ORAL DAILY
Status: DISCONTINUED | OUTPATIENT
Start: 2021-11-28 | End: 2021-11-29 | Stop reason: HOSPADM

## 2021-11-28 RX ORDER — ROSUVASTATIN CALCIUM 20 MG/1
20 TABLET, COATED ORAL DAILY
Status: DISCONTINUED | OUTPATIENT
Start: 2021-11-28 | End: 2021-11-29 | Stop reason: HOSPADM

## 2021-11-28 RX ORDER — ISOSORBIDE MONONITRATE 30 MG/1
30 TABLET, EXTENDED RELEASE ORAL DAILY
Status: DISCONTINUED | OUTPATIENT
Start: 2021-11-28 | End: 2021-11-29 | Stop reason: HOSPADM

## 2021-11-28 RX ORDER — SENNA PLUS 8.6 MG/1
1 TABLET ORAL DAILY PRN
Status: DISCONTINUED | OUTPATIENT
Start: 2021-11-28 | End: 2021-11-29 | Stop reason: HOSPADM

## 2021-11-28 RX ORDER — AMLODIPINE BESYLATE 5 MG/1
5 TABLET ORAL DAILY
Status: DISCONTINUED | OUTPATIENT
Start: 2021-11-28 | End: 2021-11-29 | Stop reason: HOSPADM

## 2021-11-28 RX ORDER — LEVOTHYROXINE SODIUM 88 UG/1
88 TABLET ORAL DAILY
Status: DISCONTINUED | OUTPATIENT
Start: 2021-11-28 | End: 2021-11-29 | Stop reason: HOSPADM

## 2021-11-28 RX ORDER — LORAZEPAM 0.5 MG/1
0.25 TABLET ORAL 2 TIMES DAILY
Status: DISCONTINUED | OUTPATIENT
Start: 2021-11-28 | End: 2021-11-29 | Stop reason: HOSPADM

## 2021-11-28 RX ORDER — SODIUM CHLORIDE 9 MG/ML
25 INJECTION, SOLUTION INTRAVENOUS PRN
Status: DISCONTINUED | OUTPATIENT
Start: 2021-11-28 | End: 2021-11-29 | Stop reason: HOSPADM

## 2021-11-28 RX ORDER — ACETAMINOPHEN 325 MG/1
650 TABLET ORAL EVERY 6 HOURS PRN
Status: DISCONTINUED | OUTPATIENT
Start: 2021-11-28 | End: 2021-11-29 | Stop reason: HOSPADM

## 2021-11-28 RX ORDER — SODIUM CHLORIDE 0.9 % (FLUSH) 0.9 %
10 SYRINGE (ML) INJECTION PRN
Status: DISCONTINUED | OUTPATIENT
Start: 2021-11-28 | End: 2021-11-29 | Stop reason: HOSPADM

## 2021-11-28 RX ORDER — CITALOPRAM 20 MG/1
20 TABLET ORAL DAILY
Status: DISCONTINUED | OUTPATIENT
Start: 2021-11-28 | End: 2021-11-29 | Stop reason: HOSPADM

## 2021-11-28 RX ORDER — SODIUM CHLORIDE 0.9 % (FLUSH) 0.9 %
10 SYRINGE (ML) INJECTION EVERY 12 HOURS SCHEDULED
Status: DISCONTINUED | OUTPATIENT
Start: 2021-11-28 | End: 2021-11-29 | Stop reason: HOSPADM

## 2021-11-28 RX ORDER — POTASSIUM CHLORIDE 7.45 MG/ML
10 INJECTION INTRAVENOUS PRN
Status: DISCONTINUED | OUTPATIENT
Start: 2021-11-28 | End: 2021-11-29 | Stop reason: HOSPADM

## 2021-11-28 RX ORDER — POTASSIUM CHLORIDE 20 MEQ/1
40 TABLET, EXTENDED RELEASE ORAL PRN
Status: DISCONTINUED | OUTPATIENT
Start: 2021-11-28 | End: 2021-11-29 | Stop reason: HOSPADM

## 2021-11-28 RX ORDER — PANTOPRAZOLE SODIUM 40 MG/1
40 TABLET, DELAYED RELEASE ORAL DAILY
Status: DISCONTINUED | OUTPATIENT
Start: 2021-11-28 | End: 2021-11-29 | Stop reason: HOSPADM

## 2021-11-28 RX ORDER — CLOPIDOGREL BISULFATE 75 MG/1
75 TABLET ORAL DAILY
Status: DISCONTINUED | OUTPATIENT
Start: 2021-11-28 | End: 2021-11-29 | Stop reason: HOSPADM

## 2021-11-28 RX ORDER — LOSARTAN POTASSIUM 50 MG/1
100 TABLET ORAL DAILY
Status: DISCONTINUED | OUTPATIENT
Start: 2021-11-28 | End: 2021-11-29 | Stop reason: HOSPADM

## 2021-11-28 RX ORDER — ACETAMINOPHEN 650 MG/1
650 SUPPOSITORY RECTAL EVERY 6 HOURS PRN
Status: DISCONTINUED | OUTPATIENT
Start: 2021-11-28 | End: 2021-11-29 | Stop reason: HOSPADM

## 2021-11-28 RX ADMIN — LORAZEPAM 0.25 MG: 0.5 TABLET ORAL at 09:57

## 2021-11-28 RX ADMIN — SODIUM CHLORIDE, PRESERVATIVE FREE 10 ML: 5 INJECTION INTRAVENOUS at 09:58

## 2021-11-28 RX ADMIN — CLOPIDOGREL BISULFATE 75 MG: 75 TABLET ORAL at 09:56

## 2021-11-28 RX ADMIN — LEVOTHYROXINE SODIUM 88 MCG: 0.09 TABLET ORAL at 05:45

## 2021-11-28 RX ADMIN — ACETAMINOPHEN 650 MG: 325 TABLET ORAL at 05:56

## 2021-11-28 RX ADMIN — LOSARTAN POTASSIUM 100 MG: 50 TABLET, FILM COATED ORAL at 09:56

## 2021-11-28 RX ADMIN — ENOXAPARIN SODIUM 40 MG: 100 INJECTION SUBCUTANEOUS at 09:55

## 2021-11-28 RX ADMIN — CITALOPRAM HYDROBROMIDE 20 MG: 20 TABLET ORAL at 09:56

## 2021-11-28 RX ADMIN — PANTOPRAZOLE SODIUM 40 MG: 40 TABLET, DELAYED RELEASE ORAL at 09:56

## 2021-11-28 RX ADMIN — ROSUVASTATIN CALCIUM 20 MG: 20 TABLET, FILM COATED ORAL at 09:56

## 2021-11-28 RX ADMIN — SODIUM CHLORIDE, PRESERVATIVE FREE 10 ML: 5 INJECTION INTRAVENOUS at 20:48

## 2021-11-28 RX ADMIN — AMLODIPINE BESYLATE 5 MG: 5 TABLET ORAL at 09:56

## 2021-11-28 RX ADMIN — ISOSORBIDE MONONITRATE 30 MG: 30 TABLET, EXTENDED RELEASE ORAL at 09:56

## 2021-11-28 RX ADMIN — LORAZEPAM 0.25 MG: 0.5 TABLET ORAL at 20:47

## 2021-11-28 RX ADMIN — METOPROLOL SUCCINATE 25 MG: 25 TABLET, EXTENDED RELEASE ORAL at 09:56

## 2021-11-28 ASSESSMENT — ENCOUNTER SYMPTOMS
PHOTOPHOBIA: 0
SHORTNESS OF BREATH: 0
VOMITING: 0
CHEST TIGHTNESS: 0
ABDOMINAL DISTENTION: 0
DIARRHEA: 0
CONSTIPATION: 0
WHEEZING: 0
BLOOD IN STOOL: 0

## 2021-11-28 ASSESSMENT — PAIN DESCRIPTION - PROGRESSION: CLINICAL_PROGRESSION: GRADUALLY WORSENING

## 2021-11-28 ASSESSMENT — PAIN DESCRIPTION - DESCRIPTORS: DESCRIPTORS: ACHING;DISCOMFORT;HEADACHE

## 2021-11-28 ASSESSMENT — PAIN - FUNCTIONAL ASSESSMENT: PAIN_FUNCTIONAL_ASSESSMENT: PREVENTS OR INTERFERES SOME ACTIVE ACTIVITIES AND ADLS

## 2021-11-28 ASSESSMENT — PAIN SCALES - GENERAL
PAINLEVEL_OUTOF10: 0
PAINLEVEL_OUTOF10: 8
PAINLEVEL_OUTOF10: 0
PAINLEVEL_OUTOF10: 0

## 2021-11-28 ASSESSMENT — PAIN DESCRIPTION - ONSET: ONSET: ON-GOING

## 2021-11-28 ASSESSMENT — PAIN DESCRIPTION - PAIN TYPE: TYPE: ACUTE PAIN

## 2021-11-28 ASSESSMENT — PAIN DESCRIPTION - FREQUENCY: FREQUENCY: INTERMITTENT

## 2021-11-28 ASSESSMENT — PAIN DESCRIPTION - ORIENTATION: ORIENTATION: LEFT

## 2021-11-28 ASSESSMENT — PAIN DESCRIPTION - LOCATION: LOCATION: HEAD;HIP

## 2021-11-28 NOTE — PROGRESS NOTES
Spoke to Dr. Maida Cash regarding consult.    Electronically signed by Carly Tinajero RN on 11/28/2021 at 4:32 AM

## 2021-11-28 NOTE — PROGRESS NOTES
Page out to Dr Blake Chou for admission orders.    Electronically signed by Johana Castano RN on 11/28/2021 at 4:20 AM

## 2021-11-28 NOTE — ED NOTES
Walked Patient just outside the door of her room and patient stated it hurt. She couldn't go any further then her door. The patient rated her pain at a 8 when walking. Nurse and Dr. Arlene Merritt notified.      Dai Held  11/27/21 6184

## 2021-11-28 NOTE — PROGRESS NOTES
While daughter was assisting patient to the bathroom, patient stated she suddenly felt nauseous and felt like she \"started to black out\". Daughter helped patient to the chair and patient became alert again. Daughter states that patient \"seemed like she passed out\". Assessment negative, heart/lung sounds WNL. Vital signs at baseline. Patient appears malaised but is now resting comfortably in bed with call light within reach. Electronically signed by Rachelle Orozco RN on 11/28/2021 at 12:01 PM

## 2021-11-28 NOTE — PROGRESS NOTES
Physical Therapy    Facility/Department: Located within [facility] MED SURG  Initial Assessment    NAME: Kristine Mark  : 1934  MRN: [id number]    Date of Service: 2021      Attending Provider:  Terry Griffiths DO    Evaluating PT:  Hanny Paul. Soni Garcia P.T. Room #:  [facility]O  Diagnosis:  Left hip pain [M25.552]  Inability to ambulate due to hip [R26.2]  Pertinent PMHx/PSHx:  emphysema  Precautions:  Falls  Imaging: X-rays and CCT scans were negative for fractures  Equipment Needs:  Wheeled walker    SUBJECTIVE:    Pt lives with her  in a 1 story home with 1 step to enter. Pt ambulated with no AD PTA. OBJECTIVE:   Initial Evaluation  Date: 21 Treatment Short Term/ Long Term   Goals   Was pt agreeable to Eval/treatment? yes     Does pt have pain? No c/o pain at rest and reported L hip pain 4/10 after amb     Bed Mobility  Rolling: Independent  Supine to sit: Independent  Sit to supine: NA  Scooting: Independent  Independent   Transfers Sit to stand: supervision  Stand to sit: supervision  Stand pivot: SBA with ww  Independent    Ambulation   15+180 feet with ww SBA  250 feet with ww Independent    Stair negotiation: ascended and descended NA  1 step with ww or rail Independent    AM-PAC 6 Clicks 62/15       BLE ROM is WFL. RLE strength is grossly 4+/5 and LLE is grossly 3-/5 to 4/5.    Sensation:  Pt c/o chronic numbness and tingling to B hands dues to carpal tunnel  Edema:  None noted  Balance: sitting is Independent and standing with ww is supervision  Endurance: fair+    Patient education  Pt educated on hand placement during transfers and gait sequence with ww    Patient response to education:   Pt verbalized understanding Pt demonstrated skill Pt requires further education in this area   yes Yes with VCs yes     ASSESSMENT:    Conditions Requiring Skilled Therapeutic Intervention:    [x]Decreased strength     []Decreased ROM  [x]Decreased functional mobility  [x]Decreased balance [x]Decreased endurance   []Decreased posture  []Decreased sensation  []Decreased coordination   []Decreased vision  []Decreased safety awareness   [x]Increased pain     Comments:  Pt was in bed and reports no c/o pain at rest, but had increased L lateral hip pain with palpation and with movement. Pt states pain is much better today. She reports pain came on for no apparent reason a few days ago and was really bad yesterday. She was provided a ww and instructed in its use and gait sequence with ww. She walked initially with slow step too gait and reported L hip pain was not as bad using ww. She asked to use BR and walked into BR and transferred on/off commode with supervision. She performed self hygiene care and stood at sink with supervision while she washed her hands. Pt then walked in the [place] again with slow step too gait pattern, but near 2nd half of amb distance she began walking with slow gait speed and small step through gait with the walker without much increase in her pain, but was instructed on using BUE to take pressure and weight off LLE during swing phase of RLE. Pt states pain only a 4/10 after amb and felt much better than yesterday. Treatment:  Patient practiced and was instructed in the following treatment:     Bed mobility, transfers, ADLs, and gait training with ww to improve functional strength, balance, and endurance. Pt was left sitting up in chair with call light left by patient. Pt's/ family goals   1. To keep pain low so she can go home. Patient and or family understand(s) diagnosis, prognosis, and plan of care. PHYSICAL THERAPY PLAN OF CARE:    PT POC is established based on physician order and patient diagnosis     Referring provider/PT Order:  PT eval and treat  Diagnosis:  Left hip pain [M25.552]  Inability to ambulate due to hip [R26.2]  Specific instructions for next treatment:  Increase amb distance as pt is able with ww and attempt stair negotiation. Current Treatment Recommendations:     [x] Strengthening to improve independence with functional mobility   [] ROM to improve ROM and decrease spasm and pain which will help promote independence with functional mobility   [x] Balance Training to improve static/dynamic balance and to reduce fall risk  [x] Endurance Training to improve activity tolerance during functional mobility   [x] Transfer Training to improve safety and independence with all functional transfers   [x] Gait Training to improve gait mechanics, endurance and assess need for appropriate assistive device  [x] Stair Training in preparation for safe discharge home and/or into the community   [] Positioning to prevent skin breakdown and contractures  [] Safety and Education Training   [x] Patient/Caregiver Education   [] HEP  [] Other     PT long term treatment goals are located in above grid    Frequency of treatments: 2-5x/week x 1-2 weeks. Time in  09:00  Time out  09:25    Total Treatment Time  15 minutes     Evaluation Time includes thorough review of current medical information, gathering information on past medical history/social history and prior level of function, completion of standardized testing/informal observation of tasks, assessment of data and education on plan of care and goals. CPT codes:  [x] Low Complexity PT evaluation 12445  [] Moderate Complexity PT evaluation 62742  [] High Complexity PT evaluation 80194  [] PT Re-evaluation 61465  [] Gait training 03775 ** minutes  [] Manual therapy 51079 ** minutes  [x] Therapeutic activities 55813 15 minutes  [] Therapeutic exercises 22210 ** minutes  [] Neuromuscular reeducation 39922 ** minutes     Adriel Barlow., P.T.   License Number: [id number]

## 2021-11-28 NOTE — CONSULTS
Orthopaedic Consultation  Destinee Rico DO      CHIEF COMPLAINT: Left hip pain    History of Present Illness: This patient is a very pleasant 31-year-old female. She is seen today in evaluation for pain in the left hip. Patient presented to the ED last night for further evaluation. She states that she had a fall due to acute onset of pain in the left hip. This pain actually began several days before, starting on Friday, 11/26/2021. States the pain began rather insidiously. Pain is laterally based about the hip. She has no previous history of pain or injury to the left hip. She has no previous history of pain or injury to her lumbar spine. She denies any radiculopathy or radiating component to her pain. Symptoms seem to be located to the lateral aspect of the hip. When she stood to go to the bathroom last night, she had a sharp reproduction of this pain, causing her to fall. Upon standing, she had difficulty placing weight through the left lower extremity due to pain. Her pain is well controlled at rest but apparently worsened with attempted activity.         Past Medical History:   Diagnosis Date    CAD (coronary artery disease)     [address] [address]     skin cancer    Emphysema lung ([facility] [address]     Hyperlipidemia     Hypertension     Thyroid disease          Past Surgical History:   Procedure Laterality Date    CORONARY ANGIOPLASTY WITH STENT PLACEMENT  2004    stents x 2    CORONARY ANGIOPLASTY WITH STENT PLACEMENT  05/03/2018    Dr. Álvaro Harrington- [facility] [place] 2.5 x 30 Mid LAD    HYSTERECTOMY      PARATHYROID GLAND SURGERY         Medications Prior to Admission:    Medications Prior to Admission: [DISCONTINUED] meloxicam (MOBIC) 7.5 MG tablet, Take 1 tablet by mouth daily  citalopram (CELEXA) 20 MG tablet, Take 20 mg by mouth daily Patient takes 1/2 tab daily  clopidogrel (PLAVIX) 75 MG tablet, Take 75 mg by mouth daily   rosuvastatin (CRESTOR) 20 MG tablet, Take 20 mg by mouth daily   pantoprazole (PROTONIX) 40 MG tablet, Take 40 mg by mouth daily   acetaminophen (TYLENOL) 500 MG tablet, Take 500 mg by mouth every 6 hours as needed for Pain  [DISCONTINUED] predniSONE (DELTASONE) 50 MG tablet, Take 50mg po qd x 5 days QS for 5 days  [DISCONTINUED] albuterol sulfate HFA (PROVENTIL HFA) 108 (90 Base) MCG/ACT inhaler, Inhale 2 puffs into the lungs every 4 hours as needed for Wheezing  [DISCONTINUED] tiZANidine (ZANAFLEX) 4 MG tablet, Take 1 tablet by mouth 2 times daily as needed (to help muscles relax)  [DISCONTINUED] ticagrelor (BRILINTA) 90 MG TABS tablet, Take 1 tablet by mouth 2 times daily  [DISCONTINUED] aspirin 81 MG EC tablet, Take 1 tablet by mouth daily  amLODIPine (NORVASC) 5 MG tablet, Take 5 mg by mouth daily  potassium chloride (KLOR-CON) 20 MEQ packet, Take 20 mEq by mouth daily  metoprolol succinate (TOPROL XL) 25 MG extended release tablet, Take 25 mg by mouth daily   calcitRIOL (ROCALTROL) 0.25 MCG capsule, Take 0.25 mcg by mouth daily  isosorbide mononitrate (IMDUR) 30 MG extended release tablet, Take 30 mg by mouth daily  LORazepam (ATIVAN) 0.5 MG tablet, Take 0.25 mg by mouth 2 times daily. losartan (COZAAR) 100 MG tablet, Take 100 mg by mouth daily  levothyroxine (SYNTHROID) 88 MCG tablet, Take 88 mcg by mouth Daily  magnesium oxide (MAG-OX) 400 MG tablet, Take 400 mg by mouth daily  Multiple Vitamins-Minerals (THERAPEUTIC MULTIVITAMIN-MINERALS) tablet, Take 1 tablet by mouth daily  calcium carbonate 600 MG TABS tablet, Take 1 tablet by mouth 3 times daily  Omega-3 Fatty Acids (FISH OIL) 435 MG CAPS, Take 360 mg by mouth three times daily   [DISCONTINUED] simvastatin (ZOCOR) 20 MG tablet, Take 20 mg by mouth nightly  [DISCONTINUED] omeprazole (PRILOSEC) 10 MG delayed release capsule, Take 10 mg by mouth Five times weekly    Allergies:    Patient has no known allergies. Social History:    reports that she has never smoked.  She has never used smokeless tobacco. She reports that she does not drink alcohol and does not use drugs. Family History:   family history is not on file. REVIEW OF SYSTEMS:  As above in the HPI, otherwise negative    PHYSICAL EXAM:    Vitals:  BP (!) 143/72   Pulse 68   Temp 97.7 °F (36.5 °C) (Oral)   Resp 16   Ht 5' 1\" (1.549 m)   Wt 137 lb (62.1 kg)   SpO2 95%   BMI 25.89 kg/m²     General:  Awake, alert, oriented X 3. Well developed, well nourished, well groomed. No apparent distress. HEENT:  Normocephalic, atraumatic. Pupils equal, round, reactive to light. No scleral icterus. No conjunctival injection. Normal lips, teeth, and gums. No nasal discharge. Neck:  Supple  Heart:  RRR, no murmurs, gallops, or rubs  Lungs:  CTA bilaterally, bilat symmetrical expansion, no wheeze, rales, or rhonchi  Abdomen: Bowel sounds present, soft, nontender, no masses, no organomegaly, no peritoneal signs  Extremities: There is tenderness to palpation directly over the greater trochanter/abductor tendon insertion of the left hip. Patient has good range of motion of the left hip. She is able to tolerate straight leg raise against resistance with 4+ out of 5 strength. There is no pain with logrolling. The skin circumferentially intact about the left lower extremity. All compartments soft. Sensation grossly intact light touch, L3-S1. Dorsiflexion/plantarflexion, EHL strength 5/5. DP and PT pulses 2+ bilaterally. Skin:  Warm and dry, no open lesions or rash  Neuro:  Cranial nerves 2-12 intact, no focal deficits  Breast: deferred  Rectal: deferred  Genitalia:  deferred    LABS:  CBC:   Lab Results   Component Value Date    WBC 10.8 11/28/2021    RBC 4.74 11/28/2021    HGB 13.5 11/28/2021    HCT 40.6 11/28/2021    MCV 85.7 11/28/2021    MCH 28.5 11/28/2021    MCHC 33.3 11/28/2021    RDW 13.2 11/28/2021     11/28/2021    MPV 10.4 11/28/2021     Imaging: X-rays of the pelvis as well as dedicated views of the left hip are reviewed.   There are no acute fracture seen.  There are calcific deposits noted likely consistent with chronic bursitis versus enthesophyte within the abductor tendon, just proximal to the greater trochanter. ASSESSMENT:      Patient Active Problem List   Diagnosis    CAD in native artery    COPD (chronic obstructive pulmonary disease) ([address]    HTN (hypertension), benign    Mixed hyperlipidemia    Status post insertion of drug-eluting stent into left anterior descending (LAD) artery    Inability to ambulate due to hip       PLAN:    After evaluation, I believe the patient's pain and symptoms are most consistent with greater trochanteric bursitis. I had a long discussion with her and her family at the bedside. Typically the initial treatment for this is physical/occupational therapy, potentially a short course of protected weightbearing with a wheeled walker and anti-inflammatory medication as necessary. I am going to write for PT and OT to assess the patient. There is no need for any sort of immediate operative intervention. Patient may follow-up in my office upon discharge. As we discussed, if she is not better we can trial a diagnostic/therapeutic cortisone injection into the greater trochanteric bursa. She may be a candidate for an outpatient MRI if she fails to improve with this nonoperative management to evaluate for pathology involving the abductor tendon musculature. All questions were answered. Patient family verbalized understanding. Please call with any further questions or concerns.     Rani Torrez, [facility]

## 2021-11-28 NOTE — H&P
History & Physicial  11/28/21  Primary Care: Mariana Arenas MD  [place]  / [facility] [id number]        Chief Complaint   Patient presents with    Hip Pain     left    Fall     -loc + blood thinner       HPI:  Patient is an 80year old female who presented to ER due to left sided hip pain and difficulty ambulating. Patient states that her  was just in the hospital. On Friday she got up from her kitchen table and states that she felt a pulling sensation in her left hip. She had been limping since that time. She states yesterday she went to the restroom and got up on her own and when she did she felt weak in her leg and fell. She had difficulty bearing weight. Patient denies any fevers, chills, cough, shortness of breath, chest pain, nausea, vomiting or diarrhea. Prior to Visit Medications    Medication Sig Taking? Authorizing Provider   citalopram (CELEXA) 20 MG tablet Take 20 mg by mouth daily Patient takes 1/2 tab daily  Historical Provider, MD   clopidogrel (PLAVIX) 75 MG tablet Take 75 mg by mouth daily   Historical Provider, MD   rosuvastatin (CRESTOR) 20 MG tablet Take 20 mg by mouth daily   Historical Provider, MD   pantoprazole (PROTONIX) 40 MG tablet Take 40 mg by mouth daily   Historical Provider, MD   acetaminophen (TYLENOL) 500 MG tablet Take 500 mg by mouth every 6 hours as needed for Pain  Historical Provider, MD   amLODIPine (NORVASC) 5 MG tablet Take 5 mg by mouth daily  Historical Provider, MD   potassium chloride (KLOR-CON) 20 MEQ packet Take 20 mEq by mouth daily  Historical Provider, MD   metoprolol succinate (TOPROL XL) 25 MG extended release tablet Take 25 mg by mouth daily   Historical Provider, MD   calcitRIOL (ROCALTROL) 0.25 MCG capsule Take 0.25 mcg by mouth daily  Historical Provider, MD   isosorbide mononitrate (IMDUR) 30 MG extended release tablet Take 30 mg by mouth daily  Historical Provider, MD   LORazepam (ATIVAN) 0.5 MG tablet Take 0.25 mg by mouth 2 times daily. Historical Provider, MD   losartan (COZAAR) 100 MG tablet Take 100 mg by mouth daily  Historical Provider, MD   levothyroxine (SYNTHROID) 88 MCG tablet Take 88 mcg by mouth Daily  Historical Provider, MD   magnesium oxide (MAG-OX) 400 MG tablet Take 400 mg by mouth daily  Historical Provider, MD   Multiple Vitamins-Minerals (THERAPEUTIC MULTIVITAMIN-MINERALS) tablet Take 1 tablet by mouth daily  Historical Provider, MD   calcium carbonate 600 MG TABS tablet Take 1 tablet by mouth 3 times daily  Historical Provider, MD   Omega-3 Fatty Acids (FISH OIL) 435 MG CAPS Take 360 mg by mouth three times daily   Historical Provider, MD     Social History     Tobacco Use    Smoking status: Never Smoker    Smokeless tobacco: Never Used   Vaping Use    Vaping Use: Never used   Substance Use Topics    Alcohol use: No    Drug use: No     History reviewed. No pertinent family history. Past Surgical History:   Procedure Laterality Date    CORONARY ANGIOPLASTY WITH STENT PLACEMENT  2004    stents x 2    CORONARY ANGIOPLASTY WITH STENT PLACEMENT  05/03/2018    Dr. Sebas Ocasio- [facility] [place] 2.5 x 30 Mid LAD    HYSTERECTOMY      PARATHYROID GLAND SURGERY       Past Medical History:   Diagnosis Date    CAD (coronary artery disease)     [facility] [address] [address]     skin cancer    Emphysema lung ([facility] [address]     Hyperlipidemia     Hypertension     Thyroid disease      Review of Systems   Constitutional: Positive for activity change. Negative for chills and fever. HENT: Negative for congestion, drooling, ear discharge and ear pain. Eyes: Negative for photophobia and visual disturbance. Respiratory: Negative for chest tightness, shortness of breath and wheezing. Cardiovascular: Negative for chest pain, palpitations and leg swelling. Gastrointestinal: Negative for abdominal distention, blood in stool, constipation, diarrhea and vomiting. Endocrine: Negative for polydipsia, polyphagia and polyuria.    Genitourinary: Negative for dysuria, frequency and hematuria. Musculoskeletal: Positive for arthralgias. Negative for joint swelling, myalgias and neck pain. Allergic/Immunologic: Negative for environmental allergies and food allergies. Neurological: Positive for weakness. Negative for dizziness, tremors and light-headedness. Hematological: Negative for adenopathy. Does not bruise/bleed easily. Psychiatric/Behavioral: Negative for agitation and confusion. Vitals:    11/27/21 2124 11/28/21 0000 11/28/21 0239 11/28/21 0302   BP: (!) 142/85 (!) 148/82 (!) 148/78 (!) 163/73   Pulse: 75 73 70 87   Resp: 18 18 18 18   Temp: 98 °F (36.7 °C) 98.2 °F (36.8 °C) 98 °F (36.7 °C) 97.8 °F (36.6 °C)   TempSrc: Oral Oral Oral Oral   SpO2: 98% 95% 94% 94%   Weight:       Height:           Physical Exam  Constitutional:       Appearance: Normal appearance. HENT:      Head: Normocephalic and atraumatic. Right Ear: External ear normal.      Left Ear: External ear normal.      Nose: Nose normal. No congestion. Mouth/Throat:      Mouth: Mucous membranes are moist.      Pharynx: Oropharynx is clear. No oropharyngeal exudate. Eyes:      General:         Right eye: No discharge. Left eye: No discharge. Conjunctiva/sclera: Conjunctivae normal.      Pupils: Pupils are equal, round, and reactive to light. Cardiovascular:      Rate and Rhythm: Normal rate and regular rhythm. Pulses: Normal pulses. Heart sounds: No murmur heard. Pulmonary:      Effort: No respiratory distress. Breath sounds: Normal breath sounds. No wheezing, rhonchi or rales. Abdominal:      General: Bowel sounds are normal. There is no distension. Palpations: Abdomen is soft. Tenderness: There is no abdominal tenderness. There is no guarding or rebound. Musculoskeletal:         General: Tenderness (Left greater trochanter. ) present. No deformity. Cervical back: Normal range of motion and neck supple.       Right lower leg: No edema. Left lower leg: No edema. Skin:     Capillary Refill: Capillary refill takes less than 2 seconds. Neurological:      General: No focal deficit present. Mental Status: She is alert and oriented to person, place, and time. Psychiatric:         Mood and Affect: Mood normal.         Behavior: Behavior normal.         Active Problems:    Inability to ambulate due to hip  Resolved Problems:    * No resolved hospital problems. *  1. Left hip pain  2. Inability to ambulate  3. Hypertension  4. HLD  5. CAD  6. COPD    Plan:  Orthopedics consulted. PT/OT. Pain control. Continue on current medications.  May need SNF placement depending on PT eval.     DVT Prophylaxis: Lovenox  Code Status: Full     Tom Mancini DO      Electronically signed by Tom Mancini DO on 11/28/2021 at 8:38 AM

## 2021-11-28 NOTE — ED PROVIDER NOTES
Coronary angioplasty with stent (05/03/2018). Social History:  reports that she has never smoked. She has never used smokeless tobacco. She reports that she does not drink alcohol and does not use drugs. Family History: family history is not on file. . Unless otherwise noted, family history is non contributory    The patients home medications have been reviewed. Allergies: Patient has no known allergies. I have reviewed the past medical history, past surgical history, social history, and family history    ---------------------------------------------------PHYSICAL EXAM--------------------------------------    General: The patient is conversational and lying comfortably in bed. Head: Normocephalic and atraumatic. Eyes: Sclera non-icteric and no conjunctival injection  ENT: Nasal and oral membranes moist  Neck: Trachea midline. No JVD  Respiratory: Lungs clear to auscultation bilaterally. Patient speaking in full sentences. Cardiovascular: Regular rate. No pedal edema. 2+ DP pulses  Gastrointestinal:  Abdomen is soft and nondistended. Bowel sounds present. There is no tenderness. There is no guarding or rebound. Musculoskeletal: No deformity. Pelvis is stable. Patient has tenderness of the left ASIS as well as the left lateral hip. She does allow logrolling. Negative straight leg test.  No tenderness of the midline spine. No step-offs or deformities. Patient able to wiggle her toes. Able to flex and extend at the knees. Cannot lift her legs up off of the bed. No bony tenderness of the upper extremities. Skin: Skin warm and dry. No rashes. Neurologic: No gross motor deficits. No aphasia. Pupils are equal and reactive to light. Extraocular eye movements intact. Sensation intact bilaterally. Symmetric facies. Tongue protrudes midline. 5 out of 5 symmetric strength of the upper and lower extremities. Negative finger-to-nose testing. Alert and oriented.   Psychiatric: Normal affect.    -------------------------------------------------- RESULTS -------------------------------------------------  I have personally reviewed all laboratory and imaging results for this patient. Results are listed below. LABS: (Lab results interpreted by me)  No results found for this visit on 11/27/21.,     RADIOLOGY:  Interpreted by Radiologist unless otherwise specified  CT PELVIS WO CONTRAST Additional Contrast? None   Final Result   No evidence of acute pelvic or hip fracture. Mild cortical irregularity along the greater trochanters with small   calcification on the left which may represent sequela from prior trochanteric   avulsion injury. Mild stranding and indistinctness of soft tissue planes lateral to the left   hip which may represent soft tissue injury likely involving the gluteus   medius muscle and overlying soft tissue planes. CT HEAD WO CONTRAST   Final Result   1. No intracranial hemorrhage or acute intracranial disease. 2.  Right sphenoid sinus small dependent fluid. No fracture seen. CT CERVICAL SPINE WO CONTRAST   Final Result   1. No fracture or acute osseous abnormality. 2.  Multilevel degenerative changes. XR PELVIS (1-2 VIEWS)   Final Result   No evidence of acute pelvic or hip fracture. Calcific densities adjacent to the greater trochanters bilaterally which may   represent sequela from prior avulsion injury or bursitis. XR LUMBAR SPINE (2-3 VIEWS)   Final Result   No acute lumbar fracture. Degenerative changes of the lumbar spine with grade 1 spondylolisthesis of L4   on L5. Transitional vertebra at the lumbosacral junction with accessory articulation   between the right transverse process of L5 and the sacrum which may be a   source of pain. Prominent facet joint arthropathy. XR HIP LEFT (2-3 VIEWS)   Final Result   No acute fracture or dislocation of the left hip.       Calcific density bordering the greater trochanter may represent sequela from   prior avulsion injury or bursitis.             ------------------------- NURSING NOTES AND VITALS REVIEWED ---------------------------   The nursing notes within the ED encounter and vital signs as below have been reviewed by myself  BP (!) 148/78   Pulse 70   Temp 98 °F (36.7 °C) (Oral)   Resp 18   Ht 5' 1\" (1.549 m)   Wt 137 lb (62.1 kg)   SpO2 94%   BMI 25.89 kg/m²     Oxygen Saturation Interpretation: Normal    The patients available past medical records and past encounters were reviewed. ------------------------------ ED COURSE/MEDICAL DECISION MAKING----------------------  Medications   acetaminophen (TYLENOL) tablet 650 mg (650 mg Oral Given 11/27/21 1931)       Medical Decision Making: This is a 80 y.o. female presenting to the ED for left hip pain. On initial presentation, the patient's vital signs are interpreted as hypertensive, afebrile and saturating well on room air. Based on history and physical exam, we have a broad differential.  The patient is having left hip pain which may be secondary to fracture, contusion, bursitis. The patient's pain began before the fall. Her symptoms do not sound radicular and she denies any back pain. The patient is unsure if she hit her head and she is on Plavix. Her fall is described as mechanical.  Her neurological exam is nonfocal.  We will obtain CT of the head as well as the cervical spine. Prior to my evaluation triage obtained left hip x-ray as well as lumbar spine x-ray. I will add on a pelvic x-ray. Patient be given Tylenol for pain control. Left hip x-ray shows no acute fracture or dislocation. Calcified density along the greater trochanter may be prior avulsion injury or bursitis. Lumbar spine x-ray shows no acute fracture. Degenerative change.   There is transitional vertebra along the lumbosacral junction with excess articulation in the right transverse process which may be source of the pain. Prominent facet arthropathy. CT of the head shows no acute or cranial abnormality. Right sphenoid small sinus dependent fluid. No fracture. CT of the cervical spine shows no fracture. Multilevel generative disease. Pelvic x-ray shows no evidence of fracture. Densities along the greater trochanters which may be prior avulsion or bursitis. This is interpreted by radiology. On reevaluation, the patient is attempted to ambulate but cannot do so. She continues to have pain. We will obtain a CT without contrast for further assessment to assure there is not a fracture. CT shows no acute fracture. Mild cortical irregularity along the greater trochanter with small calcifications which may be prior trochanteric avulsion injury. Mild stranding and indistinctiveness of soft tissue planes in the lateral left hip which may be soft tissue injury involving the gluteus medius and overlying soft tissue planes. This is interpreted by radiology. Again, we did attempt to ambulate the patient however she is unable to do so. She does note that she has 2 steps to enter her home. If she is unable to ambulate she will require admission for physical therapy and Occupational Therapy. Her and her  as well as her daughter on the telephone are agreeable to this. Patient's primary care physician is Dr. Sheila Amador. I spoke with Dr. Fallon Mei who is agreeable to the admission. She does request we place a consult orthopedic surgery for any further recommendations. This patient's ED course included: a personal history and physicial examination and re-evaluation prior to disposition    This patient has remained hemodynamically stable during their ED course. Consultations:  Internal medicine, orthopedic surgery    Oxygen Saturation Interpretation: 97 % on room air.   Normal    Counseling:   I have spoken with the patient and spouse/SO and discussed todays results, in addition to providing specific details for the plan of care and counseling regarding the diagnosis and prognosis. Questions are answered at this time and they are agreeable with the plan.     --------------------------------- IMPRESSION AND DISPOSITION ---------------------------------    IMPRESSION  1. Left hip pain    2. Inability to ambulate due to hip        DISPOSITION  Disposition: Admit to med/surg floor  Patient condition is fair    I, Dr. Josette Manzo, am the primary provider of record    NOTE: This report was transcribed using voice recognition software.  Every effort was made to ensure accuracy; however, inadvertent computerized transcription errors may be present        Josette Manzo MD  11/28/21 3991

## 2021-11-29 VITALS
TEMPERATURE: 98.2 F | OXYGEN SATURATION: 96 % | DIASTOLIC BLOOD PRESSURE: 70 MMHG | RESPIRATION RATE: 16 BRPM | HEIGHT: 61 IN | SYSTOLIC BLOOD PRESSURE: 155 MMHG | HEART RATE: 64 BPM | BODY MASS INDEX: 25.86 KG/M2 | WEIGHT: 137 LBS

## 2021-11-29 PROBLEM — I95.1 ORTHOSTASIS: Status: ACTIVE | Noted: 2021-11-29

## 2021-11-29 PROBLEM — R42 POSTURAL DIZZINESS WITH PRESYNCOPE: Status: ACTIVE | Noted: 2021-11-29

## 2021-11-29 PROBLEM — R55 POSTURAL DIZZINESS WITH PRESYNCOPE: Status: ACTIVE | Noted: 2021-11-29

## 2021-11-29 LAB
ALBUMIN SERPL-MCNC: 3.9 G/DL (ref 3.5–5.2)
ALP BLD-CCNC: 65 U/L (ref 35–104)
ALT SERPL-CCNC: 16 U/L (ref 0–32)
ANION GAP SERPL CALCULATED.3IONS-SCNC: 12 MMOL/L (ref 7–16)
AST SERPL-CCNC: 25 U/L (ref 0–31)
BASOPHILS ABSOLUTE: 0.05 E9/L (ref 0–0.2)
BASOPHILS RELATIVE PERCENT: 0.7 % (ref 0–2)
BILIRUB SERPL-MCNC: 0.5 MG/DL (ref 0–1.2)
BUN BLDV-MCNC: 21 MG/DL (ref 6–23)
CALCIUM SERPL-MCNC: 8.2 MG/DL (ref 8.6–10.2)
CHLORIDE BLD-SCNC: 104 MMOL/L (ref 98–107)
CO2: 22 MMOL/L (ref 22–29)
CREAT SERPL-MCNC: 0.8 MG/DL (ref 0.5–1)
EOSINOPHILS ABSOLUTE: 0.34 E9/L (ref 0.05–0.5)
EOSINOPHILS RELATIVE PERCENT: 4.5 % (ref 0–6)
GFR AFRICAN AMERICAN: >60
GFR NON-AFRICAN AMERICAN: >60 ML/MIN/1.73
GLUCOSE BLD-MCNC: 103 MG/DL (ref 74–99)
HCT VFR BLD CALC: 37.2 % (ref 34–48)
HEMOGLOBIN: 12.4 G/DL (ref 11.5–15.5)
IMMATURE GRANULOCYTES #: 0.03 E9/L
IMMATURE GRANULOCYTES %: 0.4 % (ref 0–5)
LYMPHOCYTES ABSOLUTE: 2.27 E9/L (ref 1.5–4)
LYMPHOCYTES RELATIVE PERCENT: 30.1 % (ref 20–42)
MAGNESIUM: 2 MG/DL (ref 1.6–2.6)
MCH RBC QN AUTO: 29.3 PG (ref 26–35)
MCHC RBC AUTO-ENTMCNC: 33.3 % (ref 32–34.5)
MCV RBC AUTO: 87.9 FL (ref 80–99.9)
MONOCYTES ABSOLUTE: 0.86 E9/L (ref 0.1–0.95)
MONOCYTES RELATIVE PERCENT: 11.4 % (ref 2–12)
NEUTROPHILS ABSOLUTE: 3.98 E9/L (ref 1.8–7.3)
NEUTROPHILS RELATIVE PERCENT: 52.9 % (ref 43–80)
PDW BLD-RTO: 13.2 FL (ref 11.5–15)
PLATELET # BLD: 260 E9/L (ref 130–450)
PMV BLD AUTO: 11 FL (ref 7–12)
POTASSIUM REFLEX MAGNESIUM: 3.4 MMOL/L (ref 3.5–5)
RBC # BLD: 4.23 E12/L (ref 3.5–5.5)
SODIUM BLD-SCNC: 138 MMOL/L (ref 132–146)
TOTAL PROTEIN: 7 G/DL (ref 6.4–8.3)
WBC # BLD: 7.5 E9/L (ref 4.5–11.5)

## 2021-11-29 PROCEDURE — 6370000000 HC RX 637 (ALT 250 FOR IP): Performed by: INTERNAL MEDICINE

## 2021-11-29 PROCEDURE — 85025 COMPLETE CBC W/AUTO DIFF WBC: CPT

## 2021-11-29 PROCEDURE — 97165 OT EVAL LOW COMPLEX 30 MIN: CPT

## 2021-11-29 PROCEDURE — 83735 ASSAY OF MAGNESIUM: CPT

## 2021-11-29 PROCEDURE — 80053 COMPREHEN METABOLIC PANEL: CPT

## 2021-11-29 PROCEDURE — 2580000003 HC RX 258: Performed by: INTERNAL MEDICINE

## 2021-11-29 PROCEDURE — 6360000002 HC RX W HCPCS: Performed by: INTERNAL MEDICINE

## 2021-11-29 PROCEDURE — 36415 COLL VENOUS BLD VENIPUNCTURE: CPT

## 2021-11-29 RX ORDER — POTASSIUM CHLORIDE 20 MEQ/1
40 TABLET, EXTENDED RELEASE ORAL ONCE
Status: COMPLETED | OUTPATIENT
Start: 2021-11-29 | End: 2021-11-29

## 2021-11-29 RX ORDER — SODIUM CHLORIDE 9 MG/ML
INJECTION, SOLUTION INTRAVENOUS CONTINUOUS
Status: DISCONTINUED | OUTPATIENT
Start: 2021-11-29 | End: 2021-11-29 | Stop reason: HOSPADM

## 2021-11-29 RX ORDER — NAPROXEN SODIUM 220 MG
220 TABLET ORAL 2 TIMES DAILY WITH MEALS
Qty: 60 TABLET | Refills: 3 | Status: SHIPPED | OUTPATIENT
Start: 2021-11-29 | End: 2022-07-19

## 2021-11-29 RX ADMIN — CITALOPRAM HYDROBROMIDE 20 MG: 20 TABLET ORAL at 09:01

## 2021-11-29 RX ADMIN — LORAZEPAM 0.25 MG: 0.5 TABLET ORAL at 08:59

## 2021-11-29 RX ADMIN — ENOXAPARIN SODIUM 40 MG: 100 INJECTION SUBCUTANEOUS at 08:59

## 2021-11-29 RX ADMIN — ISOSORBIDE MONONITRATE 30 MG: 30 TABLET, EXTENDED RELEASE ORAL at 09:00

## 2021-11-29 RX ADMIN — LOSARTAN POTASSIUM 100 MG: 50 TABLET, FILM COATED ORAL at 09:00

## 2021-11-29 RX ADMIN — POTASSIUM CHLORIDE 40 MEQ: 1500 TABLET, EXTENDED RELEASE ORAL at 06:51

## 2021-11-29 RX ADMIN — LEVOTHYROXINE SODIUM 88 MCG: 0.09 TABLET ORAL at 06:51

## 2021-11-29 RX ADMIN — CLOPIDOGREL BISULFATE 75 MG: 75 TABLET ORAL at 09:00

## 2021-11-29 RX ADMIN — ROSUVASTATIN CALCIUM 20 MG: 20 TABLET, FILM COATED ORAL at 09:00

## 2021-11-29 RX ADMIN — SODIUM CHLORIDE: 9 INJECTION, SOLUTION INTRAVENOUS at 07:04

## 2021-11-29 RX ADMIN — AMLODIPINE BESYLATE 5 MG: 5 TABLET ORAL at 09:01

## 2021-11-29 RX ADMIN — PANTOPRAZOLE SODIUM 40 MG: 40 TABLET, DELAYED RELEASE ORAL at 09:00

## 2021-11-29 RX ADMIN — METOPROLOL SUCCINATE 25 MG: 25 TABLET, EXTENDED RELEASE ORAL at 09:01

## 2021-11-29 NOTE — CARE COORDINATION
Introduced my self and provided explanation of CM role to patient. Patient is awake, alert, and aware of current diagnosis and treatment plan including ortho consult, dc planning  Patient voices her intention to discharge to home. She resides with her . She is agreeable to the ordered [facility] and [employer]. After choices provided she selected [facility] and [facility]. Referrals made to both. Anticipate discharge today as cardiology has signed off. Patient aware and agreeable.  at bedside and will transport to home. Nursing calling out to primary provider for dc order. Belkis Stone.  Carrington, MSN, RN  [facility]  [phone]

## 2021-11-29 NOTE — CONSULTS
[facility] cardiology/the [address]    Name: Flo Johnson    Age: 80 y.o. Date of Admission: 11/27/2021  5:22 PM    Date of Service: 11/29/2021    Reason for Consultation: Lightheaded, dizziness, Presyncopal  Referring Physician:     History of Present Illness: The patient is a 80y.o. year old female with a known history of chronic CAD with prior LAD stent 2004 and then another stent to LAD May 2018, chronic hypertension, came to the hospital after feeling somewhat nauseated then lightheaded and gently went to the ground without head trauma. She reports the day prior to this and the day of significant left hip discomfort and then having the nausea. There was no prodromal chest pain or palpitations no indication of any significant awareness of tachycardia. No shortness of breath. No lower extremity edema. Past Medical History: As above, echocardiogram November 2018 LVEF 65%, 1+ AI, 1-2+ MR/TR    Past surgical history hysterectomy parathyroid gland surgery    Review of Systems:     Constitutional: No fever, chills, sweats  Cardiac: As per HPI  Pulmonary: No cough, wheeze, hemoptysis  HEENT: No visual disturbances or difficult swallowing  GI: No nausea, vomiting, diarrhea, abdominal pain, rectal bleeding  : No dysuria or hematuria  Endocrine: No excessive thirst, heat or cold intolerance. Musculoskeletal: No joint pain or muscle aches. No claudication  Skin: No skin breakdown or rashes  Neuro: No headache, confusion, or seizures  Psych: No depression, anxiety      Family History:  History reviewed. No pertinent family history.     Social History:  Social History     Socioeconomic History    Marital status:      Spouse name: Not on file    Number of children: Not on file    Years of education: Not on file    Highest education level: Not on file   Occupational History    Not on file   Tobacco Use    Smoking status: Never Smoker    Smokeless tobacco: Never Used   Vaping Use    Vaping Use: Never used   Substance and Sexual Activity    Alcohol use: No    Drug use: No    Sexual activity: Not on file   Other Topics Concern    Not on file   Social History Narrative    Not on file     Social Determinants of Health     Financial Resource Strain:     Difficulty of Paying Living Expenses: Not on file   Food Insecurity:     Worried About Running Out of Food in the Last Year: Not on file    Shahzad of Food in the Last Year: Not on file   Transportation Needs:     Lack of Transportation (Medical): Not on file    Lack of Transportation (Non-Medical):  Not on file   Physical Activity:     Days of Exercise per Week: Not on file    Minutes of Exercise per Session: Not on file   Stress:     Feeling of Stress : Not on file   Social Connections:     Frequency of Communication with Friends and Family: Not on file    Frequency of Social Gatherings with Friends and Family: Not on file    Attends Buddhism Services: Not on file    Active Member of Clubs or Organizations: Not on file    Attends Club or Organization Meetings: Not on file    Marital Status: Not on file   Intimate Partner Violence:     Fear of Current or Ex-Partner: Not on file    Emotionally Abused: Not on file    Physically Abused: Not on file    Sexually Abused: Not on file   Housing Stability:     Unable to Pay for Housing in the Last Year: Not on file    Number of [place] in the Last Year: Not on file    Unstable Housing in the Last Year: Not on file       Allergies:  No Known Allergies    Current Medications:  Current Facility-Administered Medications   Medication Dose Route Frequency Provider Last Rate Last Admin    0.9 % sodium chloride infusion   IntraVENous Continuous Phoebe Kimble  mL/hr at 11/29/21 0704 New Bag at 11/29/21 0704    sodium chloride flush 0.9 % injection 10 mL  10 mL IntraVENous 2 times per day Janet Hollingsworth DO   10 mL at 11/28/21 tablet 20 mg  20 mg Oral Daily Janet Hollingsworth, DO   20 mg at 11/29/21 0900      sodium chloride 125 mL/hr at 11/29/21 0704    sodium chloride         Physical Exam:  /60   Pulse 65   Temp 98.3 °F (36.8 °C) (Oral)   Resp 16   Ht 5' 1\" (1.549 m)   Wt 137 lb (62.1 kg)   SpO2 95%   BMI 25.89 kg/m²   Weight change: Wt Readings from Last 3 Encounters:   11/27/21 137 lb (62.1 kg)   09/17/19 145 lb (65.8 kg)   08/15/19 145 lb (65.8 kg)         General: Awake, alert, oriented x3, no acute distress  HEENT: Unremarkable  Neck: No JVD or bruits. Cardiac: Regular rate and rhythm, normal S1 and S2, no extra heart sounds, murmurs, heaves, thrills  Resp: Lungs clear without wheezing or crackles. No accessory muscle use or retraction  Abdomen: soft, nontender, nondistended, no gross organomegaly or mass  Skin: Warm and dry, no cyanosis. Musculoskeletal: normal tone and strength in the upper and lower extremities bilaterally  Neuro: Grossly unremarkable  Psych: Cooperative, and normal affect    Intake/Output:  No intake or output data in the 24 hours ending 11/29/21 1118  No intake/output data recorded. Laboratory Tests:  Lab Results   Component Value Date    CREATININE 0.8 11/29/2021    BUN 21 11/29/2021     11/29/2021    K 3.4 (L) 11/29/2021     11/29/2021    CO2 22 11/29/2021     No results for input(s): CKTOTAL, CKMB in the last 72 hours.     Invalid input(s): TROPONONI  No results found for: BNP  Lab Results   Component Value Date    WBC 7.5 11/29/2021    RBC 4.23 11/29/2021    HGB 12.4 11/29/2021    HCT 37.2 11/29/2021    MCV 87.9 11/29/2021    MCH 29.3 11/29/2021    MCHC 33.3 11/29/2021    RDW 13.2 11/29/2021     11/29/2021    MPV 11.0 11/29/2021     Recent Labs     11/28/21  0550 11/29/21  0434   ALKPHOS 71 65   ALT 12 16   AST 18 25   PROT 7.6 7.0   BILITOT 0.7 0.5   LABALBU 4.4 3.9     Lab Results   Component Value Date    MG 2.0 11/29/2021     No results found for: PROTIME, INR  No results found for: TSH  No components found for: CHLPL  Lab Results   Component Value Date    TRIG 112 05/04/2018     Lab Results   Component Value Date    HDL 60 05/04/2018     Lab Results   Component Value Date    LDLCALC 76 05/04/2018     No results found for: INR    Admission ECG reviewed by me revealed normal sinus rhythm without ischemic change. I personally reviewed her telemetry and it shows normal sinus rhythm heart rate in the 70s. ASSESSMENT / PLAN:    1. Presyncope preceded by left hip pain and nausea and clearly orthostatic here in the hospital systolic blood pressure dropping 30 mmHg. Currently receiving IV fluid. No overt bleeding apparent. Today BUN 21 creatinine 0.8 a little prerenal.    Would advance activity this afternoon and ambulate as tolerated. She could be discharged to home later this evening from cardiology viewpoint and be seen back at [address] [place] [address] in 1 month. #2 hypertension and blood pressure not low here in the hospital.  Currently continues on home medications including amlodipine 5 mg daily, isosorbide mononitrate 30 mg daily, losartan 100 mg daily and metoprolol succinate 25 mg daily. If needed if orthostatic in the future could cut amlodipine or \"losartan in half. Also asked her to spread her blood pressure medications throughout the day to avoid any significant hypotension. #3 chronic CAD with stent to the LAD 2004 and May 2018. No indication of chest pain or unstable angina currently. Continue to modify cardiovascular risk factors optimizing blood pressure, blood sugar and lipids long-term. Also continues on clopidogrel 75 mg daily. #4 mild to moderate mitral valve and tricuspid valve regurgitation and afterload reduction optimizing blood pressure. No overt CHF by examination or by history. Could be discharged to home later today from cardiology viewpoint and be seen back at [address] [place] [address] in 1 month.             Electronically signed by Nica Card MD on 11/29/2021 at 11:18 AM

## 2021-11-29 NOTE — PROGRESS NOTES
mL, IntraVENous, PRN, Janet E Darrick, DO    potassium chloride (KLOR-CON M) extended release tablet 40 mEq, 40 mEq, Oral, PRN **OR** potassium bicarb-citric acid (EFFER-K) effervescent tablet 40 mEq, 40 mEq, Oral, PRN **OR** potassium chloride 10 mEq/100 mL IVPB (Peripheral Line), 10 mEq, IntraVENous, PRN, Janet E Darrick, DO    enoxaparin (LOVENOX) injection 40 mg, 40 mg, SubCUTAneous, Daily, Janet E Darrick, DO, 40 mg at 11/28/21 0955    senna (SENOKOT) tablet 8.6 mg, 1 tablet, Oral, Daily PRN, Janet E Darrick, DO    acetaminophen (TYLENOL) tablet 650 mg, 650 mg, Oral, Q6H PRN, 650 mg at 11/28/21 0556 **OR** acetaminophen (TYLENOL) suppository 650 mg, 650 mg, Rectal, Q6H PRN, Janet E Darrick, DO    amLODIPine (NORVASC) tablet 5 mg, 5 mg, Oral, Daily, Janet E Darrick, DO, 5 mg at 11/28/21 0956    citalopram (CELEXA) tablet 20 mg, 20 mg, Oral, Daily, Janet E Darrick, DO, 20 mg at 11/28/21 0956    clopidogrel (PLAVIX) tablet 75 mg, 75 mg, Oral, Daily, Janet E Darrick, DO, 75 mg at 11/28/21 0956    isosorbide mononitrate (IMDUR) extended release tablet 30 mg, 30 mg, Oral, Daily, Janet E Darrick, DO, 30 mg at 11/28/21 0956    levothyroxine (SYNTHROID) tablet 88 mcg, 88 mcg, Oral, Daily, Janet E Darrick, DO, 88 mcg at 11/28/21 0545    LORazepam (ATIVAN) tablet 0.25 mg, 0.25 mg, Oral, BID, Janet E Darrick, DO, 0.25 mg at 11/28/21 2047    losartan (COZAAR) tablet 100 mg, 100 mg, Oral, Daily, Janet E Darrick, DO, 100 mg at 11/28/21 0020    metoprolol succinate (TOPROL XL) extended release tablet 25 mg, 25 mg, Oral, Daily, Janet Hollingsworth, , 25 mg at 11/28/21 0956    pantoprazole (PROTONIX) tablet 40 mg, 40 mg, Oral, Daily, Janet Hollingsworth, , 40 mg at 11/28/21 0956    rosuvastatin (CRESTOR) tablet 20 mg, 20 mg, Oral, Daily, Janet Hollingsworth, , 20 mg at 11/28/21 5035    Assessment:    Patient Active Problem List   Diagnosis    CAD in native artery    COPD (chronic obstructive pulmonary disease) ([facility] [address]    HTN (hypertension), benign    Mixed hyperlipidemia    Status post insertion of drug-eluting stent into left anterior descending (LAD) artery    Inability to ambulate due to hip       Plan:  1. Left hip pain- orthopedics felt this was trochanteric bursitis, will arrange for home PT/OT. Pt walked well with PT. Will need front wheeled walker for home and an outpt f/u with orthopedics  2. Pre-syncope- this occurred after standing up yesterday. She was nauseous at the time raising possibliltiy of vasovagal reaction. I did also witness her orthostatic BP's this am and there was a 30 mmHg drop ins SBP (164-->133) from lying to standing. Will give some IVF and repeat later  3. HTN- on amlodipine/losartan/metoprolol  4. Hypokalemia- give dose of oral potassium  5.  Pending cardiology evaluation, pt can likely be discharged home today        Con Simmonds, MD  6:38 AM  11/29/2021

## 2021-11-29 NOTE — PROGRESS NOTES
Occupational Therapy  OCCUPATIONAL THERAPY INITIAL EVALUATION     Mikayla Serrano [facility]  [place], [address]        YRDW:                                                  Patient Name: Marla Dunaway    MRN: [id number]    : 1934    Room: [address] [place] [address]      Evaluating OT: Delmy Colon OTR/L [id number]      Referring Provider: Clara Lindsey DO    Specific Provider Orders/Date: OT eval and treat 21      Diagnosis:  Left hip pain [M25.552]  Inability to ambulate due to hip [R26.2]      Pertinent Medical History: CAD, skin CA, emphysema lung, HTN, coronary angioplasty with stent placement 5/3/18      Precautions:  Fall Risk     Assessment of current deficits    [x] Functional mobility  [x]ADLs  [x] Strength               []Cognition    [x] Functional transfers   [x] IADLs         [x] Safety Awareness   [x]Endurance    [] Fine Coordination              [x] Balance      [] Vision/perception   [x]Sensation     []Gross Motor Coordination  [] ROM  [] Delirium                   [] Motor Control     OT PLAN OF CARE   OT POC based on physician orders, patient diagnosis and results of clinical assessment    Frequency/Duration 2-4 days/wk for 2 weeks PRN   Specific OT Treatment Interventions to include:   * Instruction/training on adapted ADL techniques and AE recommendations to increase functional independence within precautions       * Training on energy conservation strategies, correct breathing pattern and techniques to improve independence/tolerance for self-care routine  * Functional transfer/mobility training/DME recommendations for increased independence, safety, and fall prevention  * Patient/Family education to increase follow through with safety techniques and functional independence  * Recommendation of environmental modifications for increased safety with functional transfers/mobility and ADLs  * Therapeutic exercise to improve motor endurance, ROM, and functional strength for ADLs/functional transfers  * Therapeutic activities to facilitate/challenge dynamic balance, stand tolerance for increased safety and independence with ADLs        Recommended Adaptive Equipment: TBD     Home Living: Pt lives with  in 1 story house with 1 DENILSON and 1 grab bar. Pt reports that she has a basement where laundry and her sewing room are located. Bathroom setup: walk in shower with grab bars, standard commode   Equipment owned: none    Prior Level of Function: independent with ADLs , independent with IADLs; functional mobility: no device    Pain Level: pt reported pain in L hip during mobility but did not rate. Pt agreeable to therapy. Cognition: A&O: 4/4; WFL command follow demonstrated. Pt was pleasant and motivated to return to PLOF and home environment.    Memory:  good   Sequencing:  good   Problem solving:  good   Judgement/safety:  good     Functional Assessment:  AM-PAC Daily Activity Raw Score: 19/24   Initial Eval Status  Date: 11/29/21 Treatment Status  Date: STGs = LTGs  Time frame: 10-14 days   Feeding I     Grooming Sup to stand at sink for hand hygiene  Mod I/I   UB Dressing Sup   Mod I/I   LB Dressing Sup to doff/don socks seated EOB   Mod I/I-with use of AD as appropriate/needed   Bathing Sup  Mod I/I -with use of AD as appropriate/needed   Toileting Sup for clothing management  I with pericare  I    Bed Mobility  Supine to sit: I   Sit to supine: I      Functional Transfers Sup with no wheeled walker  Sit<>stand from bed  Sit<>stand from commode  Cues for hand placement  I    Functional Mobility SBA with wheeled walker  To and from bathroom  Education and cues for safe wheeled walker use and management  I -with device as needed to maximize independence with ADLs and functional task completion   Balance Sitting:     Static:  I    Dynamic:Sup  Standing: Sup  I for dynamic sitting and standing balance to maximize independence with ADLs and functional task completion   Activity Tolerance Good with light activity  Good with ADL activity. Pt will demonstrate good understanding of education provided on EC/WS techniques    Visual/  Perceptual Glasses: none at bedside                Additional long-term goal: Pt will increase functional independence to PLOF to allow pt to live in least restrictive environment. Hand Dominance R   AROM (PROM) Strength Additional Info:    RUE  WFL WFL  wfl  and FMC/dexterity noted during ADL tasks       LUE [facility] WFL  wfl  and FMC/dexterity noted during ADL tasks     Hearing: [facility]   Sensation:  Pt reported some numbness and tingling in B hands d/t carpal tunnel syndrome  Tone: WFL   Edema: none noted    Comments: Upon arrival patient sitting up in bed. At end of session, patient returned to bed with call light and phone within reach, all lines and tubes intact. Overall patient demonstrated decreased independence and safety during completion of ADL/functional transfer/mobility tasks. Pt would benefit from continued skilled OT to increase safety and independence with completion of ADL/IADL tasks for functional independence and quality of life. Rehab Potential: Good for established goals     Patient / Family Goal: to return home    Patient and/or family were instructed on functional diagnosis, prognosis/goals and OT plan of care. Demonstrated good understanding.      Eval Complexity: Low    Time In: 0825  Time Out: 0840    Min Units   OT Eval Low 97165  x  1   OT Eval Medium 93436      OT Eval High 25423      OT Re-Eval [id number]       Therapeutic Ex [id number]       Therapeutic Activities 79101       ADL/Self Care 38482       Orthotic Management 61631       Manual 41116     Neuro Re-Ed 90846       Non-Billable Time          Evaluation Time additionally includes thorough review of current medical information, gathering information on past medical history/social history and prior level of function, interpretation of standardized testing/informal observation of tasks, assessment of data and development of plan of care and goals.             Germania Cnorad, OTR/L, [id number]

## 2021-11-30 NOTE — DISCHARGE SUMMARY
[address] [address]                               DISCHARGE SUMMARY    PATIENT NAME: Lizet Carlin                :        1934  MED REC NO:   [id number]                            ROOM:       1353  ACCOUNT NO:   [de-identified]                           ADMIT DATE: 2021  PROVIDER:     Nia Mahajan MD                  [address] [place] [address] DATE: 2021    FINAL DIAGNOSES:  1. Presyncope. 2.  Hip pain secondary to trochanteric bursitis. 3.  Hypertension. 4.  Orthostasis. 5.  Hypokalemia. COURSE OF ILLNESS:  This is an elderly woman who was admitted with acute  onset of left hip pain. X-rays and CAT scan did not show any evidence  of fracture and Orthopedics felt this was likely a trochanteric  bursitis. They recommended some home physical therapy and  anti-inflammatory. The patient was seen by Dr. Marilou Silvestre. She did have a  bout of presyncope in the hospital when she stood up after some  prolonged time in her bed. She felt lightheaded, dizzy, and she also  reported that she was nauseous at that time. This certainly could have  been a component of vasovagal syncope. There was no chest pain,  palpitations, or any other evidence of cardiac arrhythmia or other  neurologic symptoms. This did resolve spontaneously. This morning,  when I evaluated the patient, I did have her orthostatics checked and  witnessed her blood pressure dropped off 30 points when she went from  lying to standing with a systolic blood pressure going from 164 to 133. She was given a liter of IV fluids and this orthostasis did  significantly improve after that. The patient was seen by Physical  Therapy and Occupational Therapy while in the hospital.  Recommendation  for a front _____ walker was made. However, she otherwise ambulated  well.   Prior to discharge, she was ambulating in her room and ambulating  in the hallways without any further lightheadedness or dizziness. She  did have some mild hypertension and we will continue on her same doses  of blood pressure medication. This morning, she is noted be mildly  hypokalemic with a potassium of 3.4 and this is replaced orally. Otherwise no changes were made in her medications. The patient was  recommended to take Aleve twice a day for two weeks by myself. She  states she was supposed to do this with the clopidogrel _____ in  treatment of her bursitis. If this does not improve, hopefully she may  need a steroid injection with Orthopedics. The patient will be  discharged today. She will need a follow up appointment with me for a  transitional care visit in the next week. She will also need a follow  up appointment with Dr. Jayson Giron from [facility]. DISCHARGE MEDICATIONS:  Are as follows; Aleve one tablet b.i.d. for two  weeks. This is her only new prescription. The remainder of her  prescriptions are long-term medications and include Tylenol 500 mg every  six hours as needed, magnesium oxide 400 mg daily, lorazepam 0.5 mg half  a tablet b.i.d., citalopram 20 mg daily, rosuvastatin 20 mg daily,  pantoprazole 40 mg daily, Imdur 30 mg daily, losartan 100 mg daily,  metoprolol succinate 25 mg daily, amlodipine 5 mg daily, clopidogrel 75  mg daily, and levothyroxine 88 mcg daily. CONDITION UPON DISCHARGE:  At this time, the patient is noticed  ambulating without any further presyncopal symptoms. Her pain is  tolerable and she is able to ambulate. She will be discharged at this  time with close followup with me and Orthopedics.         Melquiades Humphrey MD    D: 11/29/2021 16:15:02       T: 11/29/2021 16:17:46     [email or web address]  Job#: [id number]     Doc#: [id number]    CC: Chronic gastritis without bleeding, unspecified gastritis type Leukopenia, unspecified type

## 2022-07-19 ENCOUNTER — APPOINTMENT (OUTPATIENT)
Dept: GENERAL RADIOLOGY | Age: 87
DRG: 310 | End: 2022-07-19
Payer: MEDICARE

## 2022-07-19 ENCOUNTER — HOSPITAL ENCOUNTER (INPATIENT)
Age: 87
LOS: 2 days | Discharge: HOME | DRG: 310 | End: 2022-07-21
Attending: EMERGENCY MEDICINE | Admitting: INTERNAL MEDICINE
Payer: MEDICARE

## 2022-07-19 DIAGNOSIS — I48.91 ATRIAL FIBRILLATION WITH RVR (HCC): Primary | ICD-10-CM

## 2022-07-19 LAB
ALBUMIN SERPL-MCNC: 4.1 G/DL (ref 3.5–5.2)
ALP BLD-CCNC: 61 U/L (ref 35–104)
ALT SERPL-CCNC: 26 U/L (ref 0–32)
ANION GAP SERPL CALCULATED.3IONS-SCNC: 15 MMOL/L (ref 7–16)
APTT: <20 SEC (ref 24.5–35.1)
AST SERPL-CCNC: 23 U/L (ref 0–31)
BASOPHILS ABSOLUTE: 0 E9/L (ref 0–0.2)
BASOPHILS RELATIVE PERCENT: 0.1 % (ref 0–2)
BILIRUB SERPL-MCNC: 0.5 MG/DL (ref 0–1.2)
BUN BLDV-MCNC: 24 MG/DL (ref 6–23)
BURR CELLS: ABNORMAL
CALCIUM SERPL-MCNC: 7.2 MG/DL (ref 8.6–10.2)
CHLORIDE BLD-SCNC: 103 MMOL/L (ref 98–107)
CO2: 19 MMOL/L (ref 22–29)
CREAT SERPL-MCNC: 1 MG/DL (ref 0.5–1)
EKG ATRIAL RATE: 90 BPM
EKG Q-T INTERVAL: 278 MS
EKG QRS DURATION: 56 MS
EKG QTC CALCULATION (BAZETT): 411 MS
EKG R AXIS: 10 DEGREES
EKG T AXIS: 47 DEGREES
EKG VENTRICULAR RATE: 132 BPM
EOSINOPHILS ABSOLUTE: 0 E9/L (ref 0.05–0.5)
EOSINOPHILS RELATIVE PERCENT: 1.1 % (ref 0–6)
GFR AFRICAN AMERICAN: >60
GFR NON-AFRICAN AMERICAN: 52 ML/MIN/1.73
GLUCOSE BLD-MCNC: 90 MG/DL (ref 74–99)
HCT VFR BLD CALC: 45.7 % (ref 34–48)
HEMOGLOBIN: 15.3 G/DL (ref 11.5–15.5)
INR BLD: 1.1
LYMPHOCYTES ABSOLUTE: 5.15 E9/L (ref 1.5–4)
LYMPHOCYTES RELATIVE PERCENT: 35.7 % (ref 20–42)
MCH RBC QN AUTO: 29 PG (ref 26–35)
MCHC RBC AUTO-ENTMCNC: 33.5 % (ref 32–34.5)
MCV RBC AUTO: 86.7 FL (ref 80–99.9)
MONOCYTES ABSOLUTE: 1.14 E9/L (ref 0.1–0.95)
MONOCYTES RELATIVE PERCENT: 7.8 % (ref 2–12)
NEUTROPHILS ABSOLUTE: 8.15 E9/L (ref 1.8–7.3)
NEUTROPHILS RELATIVE PERCENT: 56.5 % (ref 43–80)
OVALOCYTES: ABNORMAL
PDW BLD-RTO: 13.9 FL (ref 11.5–15)
PLATELET # BLD: 289 E9/L (ref 130–450)
PMV BLD AUTO: 10.9 FL (ref 7–12)
POIKILOCYTES: ABNORMAL
POTASSIUM REFLEX MAGNESIUM: 3.8 MMOL/L (ref 3.5–5)
PROTHROMBIN TIME: 11.5 SEC (ref 9.3–12.4)
RBC # BLD: 5.27 E12/L (ref 3.5–5.5)
REASON FOR REJECTION: NORMAL
REJECTED TEST: NORMAL
SODIUM BLD-SCNC: 137 MMOL/L (ref 132–146)
TOTAL PROTEIN: 6.6 G/DL (ref 6.4–8.3)
TROPONIN, HIGH SENSITIVITY: 33 NG/L (ref 0–9)
TROPONIN, HIGH SENSITIVITY: 35 NG/L (ref 0–9)
TSH SERPL DL<=0.05 MIU/L-ACNC: 0.47 UIU/ML (ref 0.27–4.2)
WBC # BLD: 14.3 E9/L (ref 4.5–11.5)

## 2022-07-19 PROCEDURE — 99285 EMERGENCY DEPT VISIT HI MDM: CPT

## 2022-07-19 PROCEDURE — 2500000003 HC RX 250 WO HCPCS: Performed by: EMERGENCY MEDICINE

## 2022-07-19 PROCEDURE — 85025 COMPLETE CBC W/AUTO DIFF WBC: CPT

## 2022-07-19 PROCEDURE — 71045 X-RAY EXAM CHEST 1 VIEW: CPT

## 2022-07-19 PROCEDURE — 2140000000 HC CCU INTERMEDIATE R&B

## 2022-07-19 PROCEDURE — 84484 ASSAY OF TROPONIN QUANT: CPT

## 2022-07-19 PROCEDURE — 85730 THROMBOPLASTIN TIME PARTIAL: CPT

## 2022-07-19 PROCEDURE — 96365 THER/PROPH/DIAG IV INF INIT: CPT

## 2022-07-19 PROCEDURE — 6370000000 HC RX 637 (ALT 250 FOR IP): Performed by: EMERGENCY MEDICINE

## 2022-07-19 PROCEDURE — 6360000002 HC RX W HCPCS: Performed by: FAMILY MEDICINE

## 2022-07-19 PROCEDURE — 96366 THER/PROPH/DIAG IV INF ADDON: CPT

## 2022-07-19 PROCEDURE — 85610 PROTHROMBIN TIME: CPT

## 2022-07-19 PROCEDURE — 84443 ASSAY THYROID STIM HORMONE: CPT

## 2022-07-19 PROCEDURE — 93005 ELECTROCARDIOGRAM TRACING: CPT | Performed by: EMERGENCY MEDICINE

## 2022-07-19 PROCEDURE — 80053 COMPREHEN METABOLIC PANEL: CPT

## 2022-07-19 PROCEDURE — 6370000000 HC RX 637 (ALT 250 FOR IP): Performed by: FAMILY MEDICINE

## 2022-07-19 RX ORDER — SODIUM CHLORIDE 0.9 % (FLUSH) 0.9 %
10 SYRINGE (ML) INJECTION PRN
Status: DISCONTINUED | OUTPATIENT
Start: 2022-07-19 | End: 2022-07-21 | Stop reason: HOSPADM

## 2022-07-19 RX ORDER — ACETAMINOPHEN 325 MG/1
650 TABLET ORAL EVERY 6 HOURS PRN
Status: DISCONTINUED | OUTPATIENT
Start: 2022-07-19 | End: 2022-07-20

## 2022-07-19 RX ORDER — CLOPIDOGREL BISULFATE 75 MG/1
75 TABLET ORAL DAILY
Status: DISCONTINUED | OUTPATIENT
Start: 2022-07-19 | End: 2022-07-21 | Stop reason: HOSPADM

## 2022-07-19 RX ORDER — ASPIRIN 81 MG/1
324 TABLET, CHEWABLE ORAL ONCE
Status: COMPLETED | OUTPATIENT
Start: 2022-07-19 | End: 2022-07-19

## 2022-07-19 RX ORDER — ONDANSETRON 4 MG/1
4 TABLET, ORALLY DISINTEGRATING ORAL EVERY 8 HOURS PRN
Status: DISCONTINUED | OUTPATIENT
Start: 2022-07-19 | End: 2022-07-21 | Stop reason: HOSPADM

## 2022-07-19 RX ORDER — CALCIUM CARBONATE 200(500)MG
500 TABLET,CHEWABLE ORAL
Status: DISCONTINUED | OUTPATIENT
Start: 2022-07-19 | End: 2022-07-21 | Stop reason: HOSPADM

## 2022-07-19 RX ORDER — CALCITRIOL 0.25 UG/1
0.25 CAPSULE, LIQUID FILLED ORAL DAILY
Status: DISCONTINUED | OUTPATIENT
Start: 2022-07-19 | End: 2022-07-21 | Stop reason: HOSPADM

## 2022-07-19 RX ORDER — SODIUM CHLORIDE 0.9 % (FLUSH) 0.9 %
5-40 SYRINGE (ML) INJECTION EVERY 12 HOURS SCHEDULED
Status: DISCONTINUED | OUTPATIENT
Start: 2022-07-19 | End: 2022-07-21 | Stop reason: HOSPADM

## 2022-07-19 RX ORDER — POLYETHYLENE GLYCOL 3350 17 G/17G
17 POWDER, FOR SOLUTION ORAL DAILY PRN
Status: DISCONTINUED | OUTPATIENT
Start: 2022-07-19 | End: 2022-07-21 | Stop reason: HOSPADM

## 2022-07-19 RX ORDER — DILTIAZEM HYDROCHLORIDE 5 MG/ML
10 INJECTION INTRAVENOUS ONCE
Status: COMPLETED | OUTPATIENT
Start: 2022-07-19 | End: 2022-07-19

## 2022-07-19 RX ORDER — ONDANSETRON 2 MG/ML
4 INJECTION INTRAMUSCULAR; INTRAVENOUS EVERY 6 HOURS PRN
Status: DISCONTINUED | OUTPATIENT
Start: 2022-07-19 | End: 2022-07-21 | Stop reason: HOSPADM

## 2022-07-19 RX ORDER — POTASSIUM CHLORIDE 20 MEQ/1
20 TABLET, EXTENDED RELEASE ORAL DAILY
COMMUNITY

## 2022-07-19 RX ORDER — ACETAMINOPHEN 650 MG/1
650 SUPPOSITORY RECTAL EVERY 6 HOURS PRN
Status: DISCONTINUED | OUTPATIENT
Start: 2022-07-19 | End: 2022-07-20

## 2022-07-19 RX ORDER — CITALOPRAM 20 MG/1
10 TABLET ORAL DAILY
Status: DISCONTINUED | OUTPATIENT
Start: 2022-07-19 | End: 2022-07-21 | Stop reason: HOSPADM

## 2022-07-19 RX ORDER — SODIUM CHLORIDE 9 MG/ML
INJECTION, SOLUTION INTRAVENOUS PRN
Status: DISCONTINUED | OUTPATIENT
Start: 2022-07-19 | End: 2022-07-21 | Stop reason: HOSPADM

## 2022-07-19 RX ORDER — ISOSORBIDE MONONITRATE 30 MG/1
30 TABLET, EXTENDED RELEASE ORAL DAILY
Status: DISCONTINUED | OUTPATIENT
Start: 2022-07-19 | End: 2022-07-21 | Stop reason: HOSPADM

## 2022-07-19 RX ORDER — METOPROLOL SUCCINATE 25 MG/1
25 TABLET, EXTENDED RELEASE ORAL DAILY
Status: DISCONTINUED | OUTPATIENT
Start: 2022-07-19 | End: 2022-07-20

## 2022-07-19 RX ORDER — ROSUVASTATIN CALCIUM 20 MG/1
20 TABLET, COATED ORAL NIGHTLY
Status: DISCONTINUED | OUTPATIENT
Start: 2022-07-19 | End: 2022-07-21 | Stop reason: HOSPADM

## 2022-07-19 RX ORDER — ENOXAPARIN SODIUM 100 MG/ML
1 INJECTION SUBCUTANEOUS 2 TIMES DAILY
Status: DISCONTINUED | OUTPATIENT
Start: 2022-07-19 | End: 2022-07-20

## 2022-07-19 RX ORDER — LOSARTAN POTASSIUM 50 MG/1
100 TABLET ORAL DAILY
Status: DISCONTINUED | OUTPATIENT
Start: 2022-07-19 | End: 2022-07-20

## 2022-07-19 RX ORDER — LORAZEPAM 0.5 MG/1
0.25 TABLET ORAL 2 TIMES DAILY
Status: DISCONTINUED | OUTPATIENT
Start: 2022-07-19 | End: 2022-07-21 | Stop reason: HOSPADM

## 2022-07-19 RX ORDER — LEVOTHYROXINE SODIUM 88 UG/1
88 TABLET ORAL DAILY
Status: DISCONTINUED | OUTPATIENT
Start: 2022-07-19 | End: 2022-07-21 | Stop reason: HOSPADM

## 2022-07-19 RX ORDER — AMLODIPINE BESYLATE 5 MG/1
5 TABLET ORAL DAILY
Status: DISCONTINUED | OUTPATIENT
Start: 2022-07-19 | End: 2022-07-21 | Stop reason: HOSPADM

## 2022-07-19 RX ORDER — PANTOPRAZOLE SODIUM 40 MG/1
40 TABLET, DELAYED RELEASE ORAL DAILY
Status: DISCONTINUED | OUTPATIENT
Start: 2022-07-19 | End: 2022-07-21 | Stop reason: HOSPADM

## 2022-07-19 RX ORDER — LANOLIN ALCOHOL/MO/W.PET/CERES
400 CREAM (GRAM) TOPICAL DAILY
Status: DISCONTINUED | OUTPATIENT
Start: 2022-07-19 | End: 2022-07-21 | Stop reason: HOSPADM

## 2022-07-19 RX ORDER — ACETAMINOPHEN 500 MG
500 TABLET ORAL EVERY 6 HOURS PRN
Status: DISCONTINUED | OUTPATIENT
Start: 2022-07-19 | End: 2022-07-21 | Stop reason: HOSPADM

## 2022-07-19 RX ORDER — POTASSIUM CHLORIDE 20 MEQ/1
20 TABLET, EXTENDED RELEASE ORAL DAILY
Status: DISCONTINUED | OUTPATIENT
Start: 2022-07-19 | End: 2022-07-21 | Stop reason: HOSPADM

## 2022-07-19 RX ORDER — PREDNISONE 10 MG/1
10 TABLET ORAL
COMMUNITY

## 2022-07-19 RX ADMIN — PANTOPRAZOLE SODIUM 40 MG: 40 TABLET, DELAYED RELEASE ORAL at 16:50

## 2022-07-19 RX ADMIN — DEXTROSE MONOHYDRATE 5 MG/HR: 50 INJECTION, SOLUTION INTRAVENOUS at 11:28

## 2022-07-19 RX ADMIN — AMLODIPINE BESYLATE 5 MG: 5 TABLET ORAL at 20:07

## 2022-07-19 RX ADMIN — CITALOPRAM 10 MG: 20 TABLET, FILM COATED ORAL at 17:00

## 2022-07-19 RX ADMIN — LORAZEPAM 0.25 MG: 0.5 TABLET ORAL at 20:06

## 2022-07-19 RX ADMIN — MAGNESIUM GLUCONATE 500 MG ORAL TABLET 400 MG: 500 TABLET ORAL at 16:52

## 2022-07-19 RX ADMIN — CLOPIDOGREL BISULFATE 75 MG: 75 TABLET ORAL at 16:52

## 2022-07-19 RX ADMIN — CALCITRIOL 0.25 MCG: 0.25 CAPSULE ORAL at 17:01

## 2022-07-19 RX ADMIN — ENOXAPARIN SODIUM 60 MG: 100 INJECTION SUBCUTANEOUS at 20:06

## 2022-07-19 RX ADMIN — LOSARTAN POTASSIUM 100 MG: 50 TABLET, FILM COATED ORAL at 16:51

## 2022-07-19 RX ADMIN — METOPROLOL SUCCINATE 25 MG: 25 TABLET, EXTENDED RELEASE ORAL at 16:52

## 2022-07-19 RX ADMIN — ROSUVASTATIN 20 MG: 20 TABLET, FILM COATED ORAL at 20:07

## 2022-07-19 RX ADMIN — CALCIUM CARBONATE 500 MG: 500 TABLET, CHEWABLE ORAL at 16:53

## 2022-07-19 RX ADMIN — ISOSORBIDE MONONITRATE 30 MG: 30 TABLET, EXTENDED RELEASE ORAL at 16:51

## 2022-07-19 RX ADMIN — POTASSIUM CHLORIDE 20 MEQ: 1500 TABLET, EXTENDED RELEASE ORAL at 16:51

## 2022-07-19 RX ADMIN — DILTIAZEM HYDROCHLORIDE 10 MG: 5 INJECTION INTRAVENOUS at 11:22

## 2022-07-19 RX ADMIN — ASPIRIN 324 MG: 81 TABLET, CHEWABLE ORAL at 14:46

## 2022-07-19 ASSESSMENT — PAIN - FUNCTIONAL ASSESSMENT: PAIN_FUNCTIONAL_ASSESSMENT: 0-10

## 2022-07-19 ASSESSMENT — PAIN SCALES - GENERAL
PAINLEVEL_OUTOF10: 3
PAINLEVEL_OUTOF10: 0

## 2022-07-19 ASSESSMENT — PAIN DESCRIPTION - DESCRIPTORS: DESCRIPTORS: TIGHTNESS

## 2022-07-19 ASSESSMENT — PAIN DESCRIPTION - LOCATION: LOCATION: CHEST

## 2022-07-19 NOTE — ED PROVIDER NOTES
in respiratory distress  Cardiovascular:  tachy irr irrrhythm. No murmurs, gallops, or rubs. 2+ distal pulses  Chest: No chest wall tenderness  GI:  Abdomen Soft, Non tender, Non distended. +BS. No organomegaly, no palpable masses,  . Musculoskeletal: Moves all extremities x 4. Warm and well perfused, no clubbing, cyanosis, or edema. Capillary refill <3 seconds  Integument: skin warm and dry. No rashes. Lymphatic: no lymphadenopathy noted  Neurologic: GCS 15, no focal deficits, y  Psychiatric: Normal Affect    -------------------------------------------------- RESULTS -------------------------------------------------  I have personally reviewed all laboratory and imaging results for this patient. Results are listed below.      LABS:  Results for orders placed or performed during the hospital encounter of 07/19/22   CBC with Auto Differential   Result Value Ref Range    WBC 14.3 (H) 4.5 - 11.5 E9/L    RBC 5.27 3.50 - 5.50 E12/L    Hemoglobin 15.3 11.5 - 15.5 g/dL    Hematocrit 45.7 34.0 - 48.0 %    MCV 86.7 80.0 - 99.9 fL    MCH 29.0 26.0 - 35.0 pg    MCHC 33.5 32.0 - 34.5 %    RDW 13.9 11.5 - 15.0 fL    Platelets 504 143 - 383 E9/L    MPV 10.9 7.0 - 12.0 fL    Neutrophils % 56.5 43.0 - 80.0 %    Lymphocytes % 35.7 20.0 - 42.0 %    Monocytes % 7.8 2.0 - 12.0 %    Eosinophils % 1.1 0.0 - 6.0 %    Basophils % 0.1 0.0 - 2.0 %    Neutrophils Absolute 8.15 (H) 1.80 - 7.30 E9/L    Lymphocytes Absolute 5.15 (H) 1.50 - 4.00 E9/L    Monocytes Absolute 1.14 (H) 0.10 - 0.95 E9/L    Eosinophils Absolute 0.00 (L) 0.05 - 0.50 E9/L    Basophils Absolute 0.00 0.00 - 0.20 E9/L    Poikilocytes 1+     Livia Cells 1+     Ovalocytes 1+    Comprehensive Metabolic Panel w/ Reflex to MG   Result Value Ref Range    Sodium 137 132 - 146 mmol/L    Potassium reflex Magnesium 3.8 3.5 - 5.0 mmol/L    Chloride 103 98 - 107 mmol/L    CO2 19 (L) 22 - 29 mmol/L    Anion Gap 15 7 - 16 mmol/L    Glucose 90 74 - 99 mg/dL    BUN 24 (H) 6 - 23 mg/dL CREATININE 1.0 0.5 - 1.0 mg/dL    GFR Non-African American 52 >=60 mL/min/1.73    GFR African American >60     Calcium 7.2 (L) 8.6 - 10.2 mg/dL    Total Protein 6.6 6.4 - 8.3 g/dL    Albumin 4.1 3.5 - 5.2 g/dL    Total Bilirubin 0.5 0.0 - 1.2 mg/dL    Alkaline Phosphatase 61 35 - 104 U/L    ALT 26 0 - 32 U/L    AST 23 0 - 31 U/L   Troponin   Result Value Ref Range    Troponin, High Sensitivity 33 (H) 0 - 9 ng/L   Protime-INR   Result Value Ref Range    Protime 11.5 9.3 - 12.4 sec    INR 1.1    APTT   Result Value Ref Range    aPTT <20.0 (L) 24.5 - 35.1 sec   TSH   Result Value Ref Range    TSH 0.469 0.270 - 4.200 uIU/mL   SPECIMEN REJECTION   Result Value Ref Range    Rejected Test TROP     Reason for Rejection see below    Troponin   Result Value Ref Range    Troponin, High Sensitivity 35 (H) 0 - 9 ng/L   EKG 12 Lead   Result Value Ref Range    Ventricular Rate 132 BPM    Atrial Rate 90 BPM    QRS Duration 56 ms    Q-T Interval 278 ms    QTc Calculation (Bazett) 411 ms    R Axis 10 degrees    T Axis 47 degrees       RADIOLOGY:  Interpreted by Radiologist.  XR CHEST PORTABLE   Final Result   No acute process. EKG:  This EKG is signed and interpreted by the EP. Time: 1111  Rate: 132  Rhythm: Atrial fibrillation  Interpretation: atrial fibrillation (new onset)  Comparison: None      ------------------------- NURSING NOTES AND VITALS REVIEWED ---------------------------   The nursing notes within the ED encounter and vital signs as below have been reviewed by myself. /67   Pulse 96   Temp 96.8 °F (36 °C) (Temporal)   Resp 20   SpO2 96%   Oxygen Saturation Interpretation: Normal    The patients available past medical records and past encounters were reviewed.         ------------------------------ ED COURSE/MEDICAL DECISION MAKING----------------------  Medications   dilTIAZem injection 10 mg (10 mg IntraVENous Given 7/19/22 1122)     Followed by   dilTIAZem 100 mg in dextrose 5 % 100 mL infusion (ADD-[place]) (5 mg/hr IntraVENous New Bag 7/19/22 1128)   acetaminophen (TYLENOL) tablet 500 mg (has no administration in time range)   amLODIPine (NORVASC) tablet 5 mg (has no administration in time range)   calcitRIOL (ROCALTROL) capsule 0.25 mcg (has no administration in time range)   calcium carbonate (TUMS) chewable tablet 500 mg (has no administration in time range)   citalopram (CELEXA) tablet 10 mg (has no administration in time range)   clopidogrel (PLAVIX) tablet 75 mg (has no administration in time range)   isosorbide mononitrate (IMDUR) extended release tablet 30 mg (has no administration in time range)   levothyroxine (SYNTHROID) tablet 88 mcg (has no administration in time range)   LORazepam (ATIVAN) tablet 0.25 mg (has no administration in time range)   losartan (COZAAR) tablet 100 mg (has no administration in time range)   magnesium oxide (MAG-OX) tablet 400 mg (has no administration in time range)   metoprolol succinate (TOPROL XL) extended release tablet 25 mg (has no administration in time range)   pantoprazole (PROTONIX) tablet 40 mg (has no administration in time range)   potassium chloride (KLOR-CON M) extended release tablet 20 mEq (has no administration in time range)   rosuvastatin (CRESTOR) tablet 20 mg (has no administration in time range)   sodium chloride flush 0.9 % injection 5-40 mL (has no administration in time range)   sodium chloride flush 0.9 % injection 10 mL (has no administration in time range)   0.9 % sodium chloride infusion (has no administration in time range)   ondansetron (ZOFRAN-ODT) disintegrating tablet 4 mg (has no administration in time range)     Or   ondansetron (ZOFRAN) injection 4 mg (has no administration in time range)   polyethylene glycol (GLYCOLAX) packet 17 g (has no administration in time range)   acetaminophen (TYLENOL) tablet 650 mg (has no administration in time range)     Or   acetaminophen (TYLENOL) suppository 650 mg (has no administration in time range)   enoxaparin (LOVENOX) injection 60 mg (has no administration in time range)   aspirin chewable tablet 324 mg (324 mg Oral Given 7/19/22 1446)         ED COURSE:       Medical Decision Making:    New onset afib rvr, sx improved with tx, hr improved, gtt titrated to keep hr < 100, will admit for further care      This patient's ED course included: a personal history and physicial examination    This patient has remained hemodynamically stable during their ED course. Re-Evaluations:             Re-evaluation. Patients symptoms are improving    Re-examination  7/19/22   1230 pM EDT          Vital Signs:   Vitals:    07/19/22 1330 07/19/22 1400 07/19/22 1430 07/19/22 1605   BP:  (!) 148/73  135/67   Pulse: 96 93 85 96   Resp: 22 21 19 20   Temp:    96.8 °F (36 °C)   TempSrc:    Temporal   SpO2: 97% 96% 97% 96%           Consultations:             esa    Critical Care: 35 min        Counseling: The emergency provider has spoken with the patient and discussed todays results, in addition to providing specific details for the plan of care and counseling regarding the diagnosis and prognosis. Questions are answered at this time and they are agreeable with the plan.       --------------------------------- IMPRESSION AND DISPOSITION ---------------------------------    IMPRESSION  1. Atrial fibrillation with RVR (HCC)        DISPOSITION  Disposition: Admit to telemetry  Patient condition is stable    NOTE: This report was transcribed using voice recognition software.  Every effort was made to ensure accuracy; however, inadvertent computerized transcription errors may be present        Mayur Hinds MD  07/19/22 1794

## 2022-07-19 NOTE — PATIENT CARE CONFERENCE
P Quality Flow/Interdisciplinary Rounds Progress Note        Quality Flow Rounds held on July 19, 2022    Disciplines Attending:  Bedside Nurse, , , and Nursing Unit [address] was admitted on 7/19/2022 10:39 AM    Anticipated Discharge Date:       Disposition:    Nicko Score:  Nicko Scale Score: 22    Readmission Risk              Risk of Unplanned Readmission:  [id number]           Discussed patient goal for the day, patient clinical progression, and barriers to discharge.   The following Goal(s) of the Day/Commitment(s) have been identified:  Labs - Report Results      Marguerite Vergara RN  July 19, 2022

## 2022-07-20 LAB
ANION GAP SERPL CALCULATED.3IONS-SCNC: 13 MMOL/L (ref 7–16)
BUN BLDV-MCNC: 31 MG/DL (ref 6–23)
CALCIUM SERPL-MCNC: 7.3 MG/DL (ref 8.6–10.2)
CHLORIDE BLD-SCNC: 106 MMOL/L (ref 98–107)
CO2: 22 MMOL/L (ref 22–29)
CREAT SERPL-MCNC: 1 MG/DL (ref 0.5–1)
GFR AFRICAN AMERICAN: >60
GFR NON-AFRICAN AMERICAN: 52 ML/MIN/1.73
GLUCOSE BLD-MCNC: 100 MG/DL (ref 74–99)
INR BLD: 1.1
POTASSIUM REFLEX MAGNESIUM: 4.2 MMOL/L (ref 3.5–5)
PROTHROMBIN TIME: 12.2 SEC (ref 9.3–12.4)
SODIUM BLD-SCNC: 141 MMOL/L (ref 132–146)

## 2022-07-20 PROCEDURE — 97161 PT EVAL LOW COMPLEX 20 MIN: CPT

## 2022-07-20 PROCEDURE — 80048 BASIC METABOLIC PNL TOTAL CA: CPT

## 2022-07-20 PROCEDURE — 36415 COLL VENOUS BLD VENIPUNCTURE: CPT

## 2022-07-20 PROCEDURE — 2140000000 HC CCU INTERMEDIATE R&B

## 2022-07-20 PROCEDURE — 2580000003 HC RX 258: Performed by: FAMILY MEDICINE

## 2022-07-20 PROCEDURE — 6370000000 HC RX 637 (ALT 250 FOR IP): Performed by: INTERNAL MEDICINE

## 2022-07-20 PROCEDURE — 97530 THERAPEUTIC ACTIVITIES: CPT

## 2022-07-20 PROCEDURE — 85610 PROTHROMBIN TIME: CPT

## 2022-07-20 PROCEDURE — 2500000003 HC RX 250 WO HCPCS: Performed by: EMERGENCY MEDICINE

## 2022-07-20 PROCEDURE — 6370000000 HC RX 637 (ALT 250 FOR IP): Performed by: FAMILY MEDICINE

## 2022-07-20 PROCEDURE — 93005 ELECTROCARDIOGRAM TRACING: CPT | Performed by: INTERNAL MEDICINE

## 2022-07-20 RX ORDER — LOSARTAN POTASSIUM 50 MG/1
50 TABLET ORAL DAILY
Qty: 30 TABLET | Refills: 3 | Status: SHIPPED | OUTPATIENT
Start: 2022-07-21

## 2022-07-20 RX ORDER — ACETAMINOPHEN 650 MG/1
650 SUPPOSITORY RECTAL EVERY 6 HOURS PRN
Status: DISCONTINUED | OUTPATIENT
Start: 2022-07-20 | End: 2022-07-21 | Stop reason: HOSPADM

## 2022-07-20 RX ORDER — METOPROLOL SUCCINATE 25 MG/1
25 TABLET, EXTENDED RELEASE ORAL 2 TIMES DAILY
Status: DISCONTINUED | OUTPATIENT
Start: 2022-07-20 | End: 2022-07-21 | Stop reason: HOSPADM

## 2022-07-20 RX ORDER — ACETAMINOPHEN 325 MG/1
650 TABLET ORAL EVERY 6 HOURS PRN
Status: DISCONTINUED | OUTPATIENT
Start: 2022-07-20 | End: 2022-07-21 | Stop reason: HOSPADM

## 2022-07-20 RX ORDER — METOPROLOL SUCCINATE 25 MG/1
25 TABLET, EXTENDED RELEASE ORAL 2 TIMES DAILY
Qty: 30 TABLET | Refills: 3 | Status: SHIPPED | OUTPATIENT
Start: 2022-07-20

## 2022-07-20 RX ORDER — LOSARTAN POTASSIUM 50 MG/1
50 TABLET ORAL DAILY
Status: DISCONTINUED | OUTPATIENT
Start: 2022-07-20 | End: 2022-07-21 | Stop reason: HOSPADM

## 2022-07-20 RX ADMIN — CLOPIDOGREL BISULFATE 75 MG: 75 TABLET ORAL at 08:41

## 2022-07-20 RX ADMIN — LORAZEPAM 0.25 MG: 0.5 TABLET ORAL at 08:38

## 2022-07-20 RX ADMIN — METOPROLOL SUCCINATE 25 MG: 25 TABLET, EXTENDED RELEASE ORAL at 08:44

## 2022-07-20 RX ADMIN — AMLODIPINE BESYLATE 5 MG: 5 TABLET ORAL at 20:38

## 2022-07-20 RX ADMIN — POTASSIUM CHLORIDE 20 MEQ: 1500 TABLET, EXTENDED RELEASE ORAL at 08:41

## 2022-07-20 RX ADMIN — METOPROLOL SUCCINATE 25 MG: 25 TABLET, EXTENDED RELEASE ORAL at 20:38

## 2022-07-20 RX ADMIN — ISOSORBIDE MONONITRATE 30 MG: 30 TABLET, EXTENDED RELEASE ORAL at 08:40

## 2022-07-20 RX ADMIN — APIXABAN 5 MG: 5 TABLET, FILM COATED ORAL at 20:38

## 2022-07-20 RX ADMIN — CALCITRIOL 0.25 MCG: 0.25 CAPSULE ORAL at 08:40

## 2022-07-20 RX ADMIN — APIXABAN 5 MG: 5 TABLET, FILM COATED ORAL at 08:49

## 2022-07-20 RX ADMIN — LEVOTHYROXINE SODIUM 88 MCG: 0.09 TABLET ORAL at 05:33

## 2022-07-20 RX ADMIN — SODIUM CHLORIDE, PRESERVATIVE FREE 10 ML: 5 INJECTION INTRAVENOUS at 20:38

## 2022-07-20 RX ADMIN — CITALOPRAM 10 MG: 20 TABLET, FILM COATED ORAL at 08:39

## 2022-07-20 RX ADMIN — PANTOPRAZOLE SODIUM 40 MG: 40 TABLET, DELAYED RELEASE ORAL at 08:41

## 2022-07-20 RX ADMIN — MAGNESIUM GLUCONATE 500 MG ORAL TABLET 400 MG: 500 TABLET ORAL at 08:40

## 2022-07-20 RX ADMIN — ROSUVASTATIN 20 MG: 20 TABLET, FILM COATED ORAL at 20:38

## 2022-07-20 RX ADMIN — DEXTROSE MONOHYDRATE 2.5 MG/HR: 50 INJECTION, SOLUTION INTRAVENOUS at 00:00

## 2022-07-20 RX ADMIN — LORAZEPAM 0.25 MG: 0.5 TABLET ORAL at 20:38

## 2022-07-20 ASSESSMENT — PAIN SCALES - GENERAL
PAINLEVEL_OUTOF10: 0

## 2022-07-20 NOTE — PATIENT CARE CONFERENCE
[facility] Quality Flow/Interdisciplinary Rounds Progress Note        Quality Flow Rounds held on July 20, 2022    Disciplines Attending:  Bedside Nurse, , , and Nursing Unit [address] was admitted on 7/19/2022 10:39 AM    Anticipated Discharge Date:       Disposition:    Nicko Score:  Nicko Scale Score: 22    Readmission Risk              Risk of Unplanned Readmission:  [id number]           Discussed patient goal for the day, patient clinical progression, and barriers to discharge.   The following Goal(s) of the Day/Commitment(s) have been identified:  Diagnostics - Report Results      Kaitlynn Peterson RN  July 20, 2022

## 2022-07-20 NOTE — CARE COORDINATION
7/20, SW met with patient by bedside along with patient's  and daughter in room. Discussed disharge planning and SW role. Patient admitted for Atrial Fibrillation with RVR. Discharge plan will be home once medically cleared for discharge. Patient lives with  in a home. DME owned is a [employer] which patient does not use. Patient is independent with ADL's. HHC in past is [facility]. Pharmacy is [facility] on [address] [place] [address] and  enosiX. PCP is Dr. Bill Baeza. Patient is on room air and no needs identified at this time. /family to be transportation for patient. SW to follow for further discharge planning needs.       ALEXX Li  [facility] Case Management  [phone]

## 2022-07-20 NOTE — CONSULTS
[facility] [address] [place] [address]    Name: Giselle Hernandez    Age: 80 y.o. Date of Admission: 7/19/2022 10:39 AM    Date of Service: 7/20/2022    Reason for Consultation: atrial fibrillation    Referring Physician: Dr Stefania Thompson  Primary Care Physician: Christal Cardenas MD    History of Present Illness: The patient is a 80y.o. year old female with CAD doing well until she developed a rash after coming home from a vacation. Saw her dermatologist and started on prednisone. States helped with the rash, but couldn't sleep and having vivid dreams. Was in to see Dr Nico Ren last week, was in sinus rhythm and was scheduled for an echo to follow MR. 7/18/22 states felt poorly, heart racing and SOB, weak. Came in for her echo which showed normal  EF 55-60%, LAE, mild TR,moderate MR and the monitor leads showed she was in AF. Cme to ED yesterday and was afib 132. Placed on cardizem gtt. Converted to sinus 7:03 AM today. Feeling a bit better. Had a fall overnight coming out of the [place] , felt a little nauseated ans sweaty. Resolved now. History of CAD  with PCI of LAD 2004, and 2018. HTN, hyperlipidemia. Past Medical History:   Past Medical History:   Diagnosis Date    CAD (coronary artery disease)     Cancer ([facility] [address]     skin cancer    Emphysema lung ([facility] [address]     Hyperlipidemia     Hypertension     Thyroid disease        Review of Systems:   Constitutional: No fever, chills, sweats  Cardiac: As per HPI  Pulmonary: No cough, wheeze, hemoptysis  HEENT: No visual disturbances, difficult swallowing  GI: No nausea, vomiting, diarrhea, abdominal pain, rectal bleeding  : No dysuria or hematuria  Endocrine: No excessive thirst, heat or cold intolerance. Musculoskeletal: No joint pain or muscle aches. No claudication  Skin: No skin breakdown or rashes  Neuro: No headache, confusion, or seizures  Psych: No depression, anxiety    Family History:  No family history on file.     Social History:  Social History     Socioeconomic History    Marital status:      Spouse name: Not on file    Number of children: Not on file    Years of education: Not on file    Highest education level: Not on file   Occupational History    Not on file   Tobacco Use    Smoking status: Never    Smokeless tobacco: Never   Vaping Use    Vaping Use: Never used   Substance and Sexual Activity    Alcohol use: No    Drug use: No    Sexual activity: Not on file   Other Topics Concern    Not on file   Social History Narrative    Not on file     Social Determinants of Health     Financial Resource Strain: Not on file   Food Insecurity: Not on file   Transportation Needs: Not on file   Physical Activity: Not on file   Stress: Not on file   Social Connections: Not on file   Intimate Partner Violence: Not on file   Housing Stability: Not on file       Allergies:  No Known Allergies    Home Medications:  Prior to Admission medications    Medication Sig Start Date End Date Taking? Authorizing Provider   potassium chloride (KLOR-CON M) 20 MEQ extended release tablet Take 20 mEq by mouth in the morning.    Yes Historical Provider, MD   predniSONE (DELTASONE) 10 MG tablet Take 10 mg by mouth See directions for remaining medication:  7/19 - 10 mg BID  7/20 - 7/22 - 10 mg daily   7/23 - 7/25 - 5 mg daily   Yes Historical Provider, MD   citalopram (CELEXA) 20 MG tablet Take 10 mg by mouth in the morning. 7/22/19   Historical Provider, MD   clopidogrel (PLAVIX) 75 MG tablet Take 75 mg by mouth daily  7/30/19   Historical Provider, MD   rosuvastatin (CRESTOR) 20 MG tablet Take 20 mg by mouth daily  6/26/19   Historical Provider, MD   pantoprazole (PROTONIX) 40 MG tablet Take 40 mg by mouth daily  8/12/19   Historical Provider, MD   acetaminophen (TYLENOL) 500 MG tablet Take 500 mg by mouth every 6 hours as needed for Pain    Historical Provider, MD   amLODIPine (NORVASC) 5 MG tablet Take 5 mg by mouth daily    Historical Provider, MD   metoprolol mg Oral Daily Braulio [place] Learn, APRN - CNP   75 mg at 07/19/22 1652    isosorbide mononitrate (IMDUR) extended release tablet 30 mg  30 mg Oral Daily Braulio [place] Learn, APRN - CNP   30 mg at 07/19/22 1651    levothyroxine (SYNTHROID) tablet 88 mcg  88 mcg Oral Daily Braulio [place] Learn, APRN - CNP   88 mcg at 07/20/22 0533    LORazepam (ATIVAN) tablet 0.25 mg  0.25 mg Oral BID Braulio [place] Learn, APRN - CNP   0.25 mg at 07/19/22 2006    losartan (COZAAR) tablet 100 mg  100 mg Oral Daily Braulio [place] Learn, APRN - CNP   100 mg at 07/19/22 1651    magnesium oxide (MAG-OX) tablet 400 mg  400 mg Oral Daily Braulio [place] Learn, APRN - CNP   400 mg at 07/19/22 1652    metoprolol succinate (TOPROL XL) extended release tablet 25 mg  25 mg Oral Daily Braulio [place] Learn, APRN - CNP   25 mg at 07/19/22 1652    pantoprazole (PROTONIX) tablet 40 mg  40 mg Oral Daily Braulio [place] Learn, APRN - CNP   40 mg at 07/19/22 1650    potassium chloride (KLOR-CON M) extended release tablet 20 mEq  20 mEq Oral Daily Braulio [place] Learn, APRN - CNP   20 mEq at 07/19/22 1651    rosuvastatin (CRESTOR) tablet 20 mg  20 mg Oral Nightly Braulio [place] Learn, APRN - CNP   20 mg at 07/19/22 2007    sodium chloride flush 0.9 % injection 5-40 mL  5-40 mL IntraVENous 2 times per day Braulio [place] Learn, APRN - CNP        sodium chloride flush 0.9 % injection 10 mL  10 mL IntraVENous PRN Brauilo [place] Learn, APRN - CNP        0.9 % sodium chloride infusion   IntraVENous PRN Braulio [place] Learn, APRN - CNP        ondansetron (ZOFRAN-ODT) disintegrating tablet 4 mg  4 mg Oral Q8H PRN Braulio [place] Learn, APRN - CNP        Or    ondansetron (ZOFRAN) injection 4 mg  4 mg IntraVENous Q6H PRN Braulio [place] Learn, APRN - CNP        polyethylene glycol (GLYCOLAX) packet 17 g  17 g Oral Daily PRN Braulio [place] Learn, APRN - CNP        acetaminophen (TYLENOL) tablet 650 mg  650 mg Oral Q6H PRN Braulio [place] Learn, APRN - CNP        Or    acetaminophen (TYLENOL) suppository 650 mg  650 mg Rectal Q6H PRN Watson Arm STACY Armendariz CNP        enoxaparin (LOVENOX) injection 60 mg  1 mg/kg (Order-Specific) SubCUTAneous BID Watson Arm Marcello APRN - CNP   60 mg at 07/19/22 2006      dilTIAZem 5 mg/hr (07/20/22 0454)    sodium chloride         Physical Exam:  /74   Pulse 72   Temp 96.9 °F (36.1 °C)   Resp 16   Ht 5' 1\" (1.549 m)   Wt 139 lb (63 kg)   SpO2 96%   BMI 26.26 kg/m²   Weight change: Wt Readings from Last 3 Encounters:   07/19/22 139 lb (63 kg)   11/27/21 137 lb (62.1 kg)   09/17/19 145 lb (65.8 kg)     General: Awake, alert, oriented x3, no acute distress  HEENT: Normocephalic and atraumatic, extraocular movements intact, pupils equal and round, moist mucus membranes, sclera anicteric  Neck: No JVD, no carotid bruits, no thyromegaly, no adenopathy  Cardiac: Regular rate and rhythm, normal S1 and physiologically split S2, no S3, no S4. Apical impulse is nondisplaced. No murmurs, no pericardial rubs, no clicks. Carotid upstrokes brisk. Respiratory: Clear bilaterally; no wheezes, no rales, no rhonchi. Unlabored respirations  Abdomen: Soft, nontender, nondistended, bowel sounds+, no hepatomegaly, no masses, no abdominal bruits  Extremities: No edema, no cyanosis, no clubbing. Distal pulses intact  Skin: Intact, warm and dry, no rashes, no breakdown  Musculoskeletal: normal tone and strength in the upper and lower extremities bilaterally  Neuro: No focal deficits. Moves all extremities appropriately to command. Normal sensation in the upper and lower extremities bilaterally  Psychiatric: Cooperative, and normal affect    Intake/Output:    Intake/Output Summary (Last 24 hours) at 7/20/2022 0815  Last data filed at 7/19/2022 1844  Gross per 24 hour   Intake 35 ml   Output --   Net 35 ml     No intake/output data recorded.     Laboratory Tests:  Last 3 CBC:  Recent Labs     07/19/22  1109   WBC 14.3*   RBC 5.27   HGB 15.3   HCT 45.7   MCV 86.7   MCH 29.0   MCHC 33.5   RDW 13.9   PLT 289   MPV 10.9       Last 3 CMP:    Recent Labs     07/19/22  1109 07/20/22  0600    141   K 3.8 4.2    106   CO2 19* 22   BUN 24* 31*   CREATININE 1.0 1.0   GLUCOSE 90 100*   CALCIUM 7.2* 7.3*   PROT 6.6  --    LABALBU 4.1  --    BILITOT 0.5  --    ALKPHOS 61  --    AST 23  --    ALT 26  --        Last 3 Mag/Phos:  No results for input(s): MG, PHOS in the last 72 hours. Last 3 CK, CKMB, Troponin  No results for input(s): CKTOTAL, CKMB, TROPONINI in the last 72 hours. Last 3 Pro-BNP:  No results for input(s): PROBNP in the last 72 hours. Lab Results   Component Value Date    PROBNP 530 (H) 04/04/2019       Last 3 Glucose:     Recent Labs     07/19/22  1109 07/20/22  0600   GLUCOSE 90 100*       Last 3 Coags:  Recent Labs     07/19/22  1109 07/20/22  0600   PROTIME 11.5 12.2   INR 1.1 1.1     Lab Results   Component Value Date/Time    PROTIME 12.2 07/20/2022 06:00 AM    INR 1.1 07/20/2022 06:00 AM       Last 3 Lipid Panel:  No results for input(s): LDLCALC, HDL, TRIG in the last 72 hours. Invalid input(s): CHLPL  Lab Results   Component Value Date    LDLCALC 76 05/04/2018     Lab Results   Component Value Date    HDL 60 05/04/2018     Lab Results   Component Value Date    TRIG 112 05/04/2018     No components found for: CHLPL    TSH:  Recent Labs     07/19/22  1109   TSH 0.469     Lab Results   Component Value Date/Time    TSH 0.469 07/19/2022 11:09 AM       ABGs:  No results for input(s): PH, PO2, PCO2, HCO3, BE, O2SAT in the last 72 hours. Lactic Acid:  No results for input(s): LACTA in the last 72 hours. Radiology:  RAD Results:  XR CHEST PORTABLE   Final Result   No acute process. EKG and Telemetry:  12-lead EKG personally reviewed and shows atrial fibrillation 132, nonspecific ST abnormalities    Telemetry personally reviewed and shows atrial fibrillation overnight, converted to NSR at 7:03 AM today, now looks like wandering atrial pacemaker.         ASSESSMENT / PLAN:    1. Paroxysmal atrial fibrillation- [id number] score 5. Would recommend OAC, eliquis 5 mg bid. Just had echo, no need to repeat. Would increase toprol to 25 mg bid , as long as in sinus can wean off cardizem gtt. Leave off prednisone. Could probably go once on oral meds. And f/u with Dr Tommy Owens in August.  2 CAD- PCI LAD X2 , no acute changes on ECG troponin 33/35- likely from tachycardia. Continue plavix as before for stents, can D/c aspirin. Continue beta blocker and statin. 3 Hyperlipidemia- on statin  4 Hypertension- as increasing toprol, would cut losartan to 50 mg/day. Thank you for consultation.     David Tobias MD, MD, [facility] at [address]    Electronically signed by David Tobias MD on 7/20/2022 at 8:15 AM

## 2022-07-20 NOTE — H&P
deficits  Breast: deferred  Rectal: deferred  Genitalia:  deferred    LABS:  Data reviewed      ASSESSMENT:      Principal Problem:    Atrial fibrillation with RVR ([address]  New onset  Currently rate is well controlled  Comorbidities present and past as listed below  Patient Active Problem List   Diagnosis    CAD in native artery    COPD (chronic obstructive pulmonary disease) (HCC)    HTN (hypertension), benign    Mixed hyperlipidemia    Status post insertion of drug-eluting stent into left anterior descending (LAD) artery    Inability to ambulate due to hip    Postural dizziness with presyncope    Orthostasis    Atrial fibrillation with RVR ([address]           PLAN:  Discussed in detail with the patient  Needs to be on long-term anticoagulation  Rate control for the time being  May be discharged at the discretion of cardiology    Yanira Matos MD  1:38 PM  7/20/2022

## 2022-07-21 VITALS
DIASTOLIC BLOOD PRESSURE: 75 MMHG | WEIGHT: 139 LBS | RESPIRATION RATE: 22 BRPM | TEMPERATURE: 97 F | HEIGHT: 61 IN | OXYGEN SATURATION: 97 % | BODY MASS INDEX: 26.24 KG/M2 | SYSTOLIC BLOOD PRESSURE: 153 MMHG | HEART RATE: 72 BPM

## 2022-07-21 LAB
EKG ATRIAL RATE: 81 BPM
EKG P-R INTERVAL: 140 MS
EKG Q-T INTERVAL: 408 MS
EKG QRS DURATION: 68 MS
EKG QTC CALCULATION (BAZETT): 473 MS
EKG R AXIS: -10 DEGREES
EKG T AXIS: 51 DEGREES
EKG VENTRICULAR RATE: 81 BPM

## 2022-07-21 PROCEDURE — 6370000000 HC RX 637 (ALT 250 FOR IP): Performed by: FAMILY MEDICINE

## 2022-07-21 PROCEDURE — 2580000003 HC RX 258: Performed by: FAMILY MEDICINE

## 2022-07-21 PROCEDURE — 6370000000 HC RX 637 (ALT 250 FOR IP): Performed by: INTERNAL MEDICINE

## 2022-07-21 RX ADMIN — AMLODIPINE BESYLATE 5 MG: 5 TABLET ORAL at 08:57

## 2022-07-21 RX ADMIN — LORAZEPAM 0.25 MG: 0.5 TABLET ORAL at 09:02

## 2022-07-21 RX ADMIN — POTASSIUM CHLORIDE 20 MEQ: 1500 TABLET, EXTENDED RELEASE ORAL at 09:02

## 2022-07-21 RX ADMIN — ISOSORBIDE MONONITRATE 30 MG: 30 TABLET, EXTENDED RELEASE ORAL at 11:55

## 2022-07-21 RX ADMIN — LEVOTHYROXINE SODIUM 88 MCG: 0.09 TABLET ORAL at 05:56

## 2022-07-21 RX ADMIN — SODIUM CHLORIDE, PRESERVATIVE FREE 10 ML: 5 INJECTION INTRAVENOUS at 09:02

## 2022-07-21 RX ADMIN — LOSARTAN POTASSIUM 50 MG: 50 TABLET, FILM COATED ORAL at 08:57

## 2022-07-21 RX ADMIN — CITALOPRAM 10 MG: 20 TABLET, FILM COATED ORAL at 08:57

## 2022-07-21 RX ADMIN — METOPROLOL SUCCINATE 25 MG: 25 TABLET, EXTENDED RELEASE ORAL at 08:58

## 2022-07-21 RX ADMIN — PANTOPRAZOLE SODIUM 40 MG: 40 TABLET, DELAYED RELEASE ORAL at 09:02

## 2022-07-21 RX ADMIN — MAGNESIUM GLUCONATE 500 MG ORAL TABLET 400 MG: 500 TABLET ORAL at 08:57

## 2022-07-21 RX ADMIN — CLOPIDOGREL BISULFATE 75 MG: 75 TABLET ORAL at 09:02

## 2022-07-21 RX ADMIN — CALCITRIOL 0.25 MCG: 0.25 CAPSULE ORAL at 08:56

## 2022-07-21 RX ADMIN — APIXABAN 5 MG: 5 TABLET, FILM COATED ORAL at 08:57

## 2022-07-21 ASSESSMENT — PAIN SCALES - GENERAL
PAINLEVEL_OUTOF10: 0

## 2022-07-21 NOTE — PATIENT CARE CONFERENCE
[facility] Quality Flow/Interdisciplinary Rounds Progress Note        Quality Flow Rounds held on July 20, 2022    Disciplines Attending:  Bedside Nurse, , , and Nursing Unit [address] was admitted on 7/19/2022 10:39 AM    Anticipated Discharge Date:       Disposition:    Nciko Score:  Nicko Scale Score: 23    Readmission Risk              Risk of Unplanned Readmission:  [id number]           Discussed patient goal for the day, patient clinical progression, and barriers to discharge.   The following Goal(s) of the Day/Commitment(s) have been identified:  Diagnostics - Report Results      Bong Garzon RN  July 20, 2022

## 2022-07-21 NOTE — DISCHARGE INSTRUCTIONS
Atrial Fibrillation: Care Instructions  Your Care Instructions     Atrial fibrillation is an irregular and often fast heartbeat. Treating this condition is important for several reasons. It can cause blood clots, which can travel from your heart to your brain and cause a stroke. If you have a fast heartbeat, you may feel lightheaded, dizzy, and weak. An irregular heartbeatcan also increase your risk for heart failure. Atrial fibrillation is often the result of another heart condition, such as high blood pressure or coronary artery disease. Making changes to improve yourheart condition will help you stay healthy and active. Follow-up care is a key part of your treatment and safety. Be sure to make and go to all appointments, and call your doctor if you are having problems. It's also a good idea to know your test results and keep alist of the medicines you take. How can you care for yourself at home? Medicines    Take your medicines exactly as prescribed. Call your doctor if you think you are having a problem with your medicine. You will get more details on the specific medicines your doctor prescribes. If your doctor has given you a blood thinner to prevent a stroke, be sure you get instructions about how to take your medicine safely. Blood thinners can cause serious bleeding problems. Do not take any vitamins, over-the-counter drugs, or herbal products without talking to your doctor first.   Lifestyle changes    Do not smoke. Smoking can increase your chance of a stroke and heart attack. If you need help quitting, talk to your doctor about stop-smoking programs and medicines. These can increase your chances of quitting for good. Eat a heart-healthy diet. Stay at a healthy weight. Lose weight if you need to. Limit alcohol to 2 drinks a day for men and 1 drink a day for women. Too much alcohol can cause health problems. Avoid colds and flu. Get a pneumococcal vaccine shot.  If you have had one before, ask your doctor whether you need another dose. Get a flu shot every year. If you must be around people with colds or flu, wash your hands often. Activity    If your doctor recommends it, get more exercise. Walking is a good choice. Bit by bit, increase the amount you walk every day. Try for at least 30 minutes on most days of the week. You also may want to swim, bike, or do other activities. Your doctor may suggest that you join a cardiac rehabilitation program so that you can have help increasing your physical activity safely. Start light exercise if your doctor says it is okay. Even a small amount will help you get stronger, have more energy, and manage stress. Walking is an easy way to get exercise. Start out by walking a little more than you did in the hospital. Gradually increase the amount you walk. When you exercise, watch for signs that your heart is working too hard. You are pushing too hard if you cannot talk while you are exercising. If you become short of breath or dizzy or have chest pain, sit down and rest immediately. Check your pulse regularly. Place two fingers on the artery at the palm side of your wrist, in line with your thumb. If your heartbeat seems uneven or fast, talk to your doctor. When should you call for help? Call 911 anytime you think you may need emergency care. For example, call if:    You have symptoms of a heart attack. These may include:  Chest pain or pressure, or a strange feeling in the chest.  Sweating. Shortness of breath. Nausea or vomiting. Pain, pressure, or a strange feeling in the back, neck, jaw, or upper belly or in one or both shoulders or arms. Lightheadedness or sudden weakness. A fast or irregular heartbeat. After you call 911, the  may tell you to chew 1 adult-strength or 2 to 4 low-dose aspirin. Wait for an ambulance. Do not try to drive yourself. You have symptoms of a stroke.  These may include:  Sudden numbness, tingling, weakness, or loss of movement in your face, arm, or leg, especially on only one side of your body. Sudden vision changes. Sudden trouble speaking. Sudden confusion or trouble understanding simple statements. Sudden problems with walking or balance. A sudden, severe headache that is different from past headaches. You passed out (lost consciousness). Call your doctor now or seek immediate medical care if:    You have new or increased shortness of breath. You feel dizzy or lightheaded, or you feel like you may faint. Your heart rate becomes irregular. You can feel your heart flutter in your chest or skip heartbeats. Tell your doctor if these symptoms are new or worse. Watch closely for changes in your health, and be sure to contact your doctor ifyou have any problems. Where can you learn more? Go to https://[employer].Ecorithm. org and sign in to your e[employer]ek[employer] account. Enter U020 in the Be[employer] box to learn more about \"Atrial Fibrillation: Care Instructions. \"     If you do not have an account, please click on the \"Sign Up Now\" link. Current as of: January 10, 2022               Content Version: 13.3  © 1509-4556 [employer]wise, Incorporated. Care instructions adapted under license by [facility] ([facility]). If you have questions about a medical condition or this instruction, always ask your healthcare professional. [address]  any warranty or liability for your use of this information.

## 2022-07-21 NOTE — PATIENT CARE CONFERENCE
P Quality Flow/Interdisciplinary Rounds Progress Note        Quality Flow Rounds held on July 21, 2022    Disciplines Attending:  Bedside Nurse, , , and Nursing Unit [address] was admitted on 7/19/2022 10:39 AM    Anticipated Discharge Date:       Disposition:    Nicko Score:  Nicko Scale Score: 22    Readmission Risk              Risk of Unplanned Readmission:  [id number]           Discussed patient goal for the day, patient clinical progression, and barriers to discharge.   The following Goal(s) of the Day/Commitment(s) have been identified:  Labs - Report Results      Ernestine Corbett RN  July 21, 2022

## 2022-07-21 NOTE — PROGRESS NOTES
[facility] [address] [place] [address]    Name: Valeria Flores    Age: 80 y.o. Date of Admission: 7/19/2022 10:39 AM    Date of Service: 7/21/2022    Reason for Consultation: atrial fibrillation    Referring Physician: Dr Moreno Smith  Primary Care Physician: Bassem Devine MD    History of Present Illness: The patient is a 80y.o. year old female with CAD doing well until she developed a rash after coming home from a vacation. Saw her dermatologist and started on prednisone. States helped with the rash, but couldn't sleep and having vivid dreams. Was in to see Dr Miguel Angel Lee last week, was in sinus rhythm and was scheduled for an echo to follow MR. 7/18/22 states felt poorly, heart racing and SOB, weak. Came in for her echo which showed normal  EF 55-60%, LAE, mild TR,moderate MR and the monitor leads showed she was in AF. Cme to ED yesterday and was afib 132. Placed on cardizem gtt. Converted to sinus 7:03 AM today. Feeling a bit better. Had a fall overnight coming out of the [place] , felt a little nauseated ans sweaty. Resolved now. History of CAD  with PCI of LAD 2004, and 2018. HTN, hyperlipidemia. Interm7/21/22:  No new complaints, remains in sinus rhythm  Tele sinus PACs    Past Medical History:   Past Medical History:   Diagnosis Date    CAD (coronary artery disease)     Cancer ([facility] [address]     skin cancer    Emphysema lung ([facility] [address]     Hyperlipidemia     Hypertension     Thyroid disease        Review of Systems:   Constitutional: No fever, chills, sweats  Cardiac: As per HPI  Pulmonary: No cough, wheeze, hemoptysis  HEENT: No visual disturbances, difficult swallowing  GI: No nausea, vomiting, diarrhea, abdominal pain, rectal bleeding  : No dysuria or hematuria  Endocrine: No excessive thirst, heat or cold intolerance. Musculoskeletal: No joint pain or muscle aches.  No claudication  Skin: No skin breakdown or rashes  Neuro: No headache, confusion, or seizures  Psych: No depression, anxiety  Allergies:  No Known Allergies    Home Medications:  Prior to Admission medications    Medication Sig Start Date End Date Taking? Authorizing Provider   apixaban (ELIQUIS) 5 MG TABS tablet Take 1 tablet by mouth in the morning and 1 tablet before bedtime. 7/21/22  Yes Rafal Corbett MD   losartan (COZAAR) 50 MG tablet Take 1 tablet by mouth in the morning. 7/21/22  Yes Rafal Corbett MD   metoprolol succinate (TOPROL XL) 25 MG extended release tablet Take 1 tablet by mouth in the morning and at bedtime 7/20/22  Yes Rafal Corbett MD   potassium chloride (KLOR-CON M) 20 MEQ extended release tablet Take 20 mEq by mouth in the morning. Yes Historical Provider, MD   predniSONE (DELTASONE) 10 MG tablet Take 10 mg by mouth See directions for remaining medication:  7/19 - 10 mg BID  7/20 - 7/22 - 10 mg daily   7/23 - 7/25 - 5 mg daily   Yes Historical Provider, MD   citalopram (CELEXA) 20 MG tablet Take 10 mg by mouth in the morning. 7/22/19   Historical Provider, MD   clopidogrel (PLAVIX) 75 MG tablet Take 75 mg by mouth daily  7/30/19   Historical Provider, MD   rosuvastatin (CRESTOR) 20 MG tablet Take 20 mg by mouth daily  6/26/19   Historical Provider, MD   pantoprazole (PROTONIX) 40 MG tablet Take 40 mg by mouth daily  8/12/19   Historical Provider, MD   acetaminophen (TYLENOL) 500 MG tablet Take 500 mg by mouth every 6 hours as needed for Pain    Historical Provider, MD   amLODIPine (NORVASC) 5 MG tablet Take 5 mg by mouth daily    Historical Provider, MD   calcitRIOL (ROCALTROL) 0.25 MCG capsule Take 0.25 mcg by mouth daily    Historical Provider, MD   isosorbide mononitrate (IMDUR) 30 MG extended release tablet Take 30 mg by mouth daily    Historical Provider, MD   LORazepam (ATIVAN) 0.5 MG tablet Take 0.25 mg by mouth 2 times daily.      Historical Provider, MD   levothyroxine (SYNTHROID) 88 MCG tablet Take 88 mcg by mouth Daily    Historical Provider, MD   magnesium oxide (MAG-OX) 400 MG tablet Take 400 [place] Learn, APRN - CNP   30 mg at 07/20/22 0840    levothyroxine (SYNTHROID) tablet 88 mcg  88 mcg Oral Daily Janann Pigeon Learn, APRN - CNP   88 mcg at 07/21/22 0556    LORazepam (ATIVAN) tablet 0.25 mg  0.25 mg Oral BID Janann [place] Learn, APRN - CNP   0.25 mg at 07/21/22 0902    magnesium oxide (MAG-OX) tablet 400 mg  400 mg Oral Daily Janann Pigeon Learn, APRN - CNP   400 mg at 07/21/22 0857    pantoprazole (PROTONIX) tablet 40 mg  40 mg Oral Daily Janann Pige[place] Learn, APRN - CNP   40 mg at 07/21/22 0902    potassium chloride (KLOR-CON M) extended release tablet 20 mEq  20 mEq Oral Daily Janann Pigeon Learn, APRN - CNP   20 mEq at 07/21/22 0902    rosuvastatin (CRESTOR) tablet 20 mg  20 mg Oral Nightly Janann Pige[place] Learn, APRN - CNP   20 mg at 07/20/22 2038    sodium chloride flush 0.9 % injection 5-40 mL  5-40 mL IntraVENous 2 times per day Janann Pigeon Learn, APRN - CNP   10 mL at 07/21/22 0902    sodium chloride flush 0.9 % injection 10 mL  10 mL IntraVENous PRN Janann Pigeon Learn, APRN - CNP        0.9 % sodium chloride infusion   IntraVENous PRN Janann Pigeon Learn, APRN - CNP        ondansetron (ZOFRAN-ODT) disintegrating tablet 4 mg  4 mg Oral Q8H PRN Janann Pigeon Learn, APRN - CNP        Or    ondansetron [facility]) injection 4 mg  4 mg IntraVENous Q6H PRN Janann Pigeon Learn, APRN - CNP        polyethylene glycol (GLYCOLAX) packet 17 g  17 g Oral Daily PRN Janann Pigeon Learn, APRN - CNP          dilTIAZem Stopped (07/20/22 1256)    sodium chloride         Physical Exam:  /65   Pulse 69   Temp 97.2 °F (36.2 °C) (Temporal)   Resp 18   Ht 5' 1\" (1.549 m)   Wt 139 lb (63 kg)   SpO2 98%   BMI 26.26 kg/m²   Weight change:    Wt Readings from Last 3 Encounters:   07/19/22 139 lb (63 kg)   11/27/21 137 lb (62.1 kg)   09/17/19 145 lb (65.8 kg)     General: Awake, alert, oriented x3, no acute distress  HEENT: Normocephalic and atraumatic, extraocular movements intact, pupils equal and round, moist mucus membranes, sclera anicteric  Neck: No JVD, no carotid bruits, no thyromegaly, no adenopathy  Cardiac: Regular rate and rhythm, normal S1 and physiologically split S2, no S3, no S4. Apical impulse is nondisplaced. No murmurs, no pericardial rubs, no clicks. Carotid upstrokes brisk. Respiratory: Clear bilaterally; no wheezes, no rales, no rhonchi. Unlabored respirations  Abdomen: Soft, nontender, nondistended, bowel sounds+, no hepatomegaly, no masses, no abdominal bruits  Extremities: No edema, no cyanosis, no clubbing. Distal pulses intact  Skin: Intact, warm and dry, no rashes, no breakdown  Musculoskeletal: normal tone and strength in the upper and lower extremities bilaterally  Neuro: No focal deficits. Moves all extremities appropriately to command. Normal sensation in the upper and lower extremities bilaterally  Psychiatric: Cooperative, and normal affect    Intake/Output:    Intake/Output Summary (Last 24 hours) at 7/21/2022 1102  Last data filed at 7/21/2022 0751  Gross per 24 hour   Intake 660 ml   Output --   Net 660 ml     I/O this shift:  In: 240 [P.O.:240]  Out: -     Laboratory Tests:  Last 3 CBC:  Recent Labs     07/19/22  1109   WBC 14.3*   RBC 5.27   HGB 15.3   HCT 45.7   MCV 86.7   MCH 29.0   MCHC 33.5   RDW 13.9      MPV 10.9       Last 3 CMP:    Recent Labs     07/19/22  1109 07/20/22  0600    141   K 3.8 4.2    106   CO2 19* 22   BUN 24* 31*   CREATININE 1.0 1.0   GLUCOSE 90 100*   CALCIUM 7.2* 7.3*   PROT 6.6  --    LABALBU 4.1  --    BILITOT 0.5  --    ALKPHOS 61  --    AST 23  --    ALT 26  --        Last 3 Mag/Phos:  No results for input(s): MG, PHOS in the last 72 hours. Last 3 CK, CKMB, Troponin  No results for input(s): CKTOTAL, CKMB, TROPONINI in the last 72 hours. Last 3 Pro-BNP:  No results for input(s): PROBNP in the last 72 hours.   Lab Results   Component Value Date    PROBNP 530 (H) 04/04/2019       Last 3 Glucose:     Recent Labs     07/19/22  1109 07/20/22  0600   GLUCOSE 90 100*       Last 3 Coags:  Recent Labs     07/19/22  1109 07/20/22  0600   PROTIME 11.5 12.2   INR 1.1 1.1     Lab Results   Component Value Date/Time    PROTIME 12.2 07/20/2022 06:00 AM    INR 1.1 07/20/2022 06:00 AM       Last 3 Lipid Panel:  No results for input(s): LDLCALC, HDL, TRIG in the last 72 hours. Invalid input(s): CHLPL  Lab Results   Component Value Date    LDLCALC 76 05/04/2018     Lab Results   Component Value Date    HDL 60 05/04/2018     Lab Results   Component Value Date    TRIG 112 05/04/2018     No components found for: CHLPL    TSH:  Recent Labs     07/19/22  1109   TSH 0.469     Lab Results   Component Value Date/Time    TSH 0.469 07/19/2022 11:09 AM       ABGs:  No results for input(s): PH, PO2, PCO2, HCO3, BE, O2SAT in the last 72 hours. Lactic Acid:  No results for input(s): LACTA in the last 72 hours. Radiology:  RAD Results:  XR CHEST PORTABLE   Final Result   No acute process. ASSESSMENT / PLAN:    1. Paroxysmal atrial fibrillation- [id number] score 5. Would recommend OAC, eliquis 5 mg bid. Just had echo, no need to repeat. Would increase toprol to 25 mg bid , Leave off prednisone. Can be discharged and f/u with Dr Lester Rodríguez in August.  2 CAD- PCI LAD X2 , no acute changes on ECG troponin 33/35- likely from tachycardia. Continue plavix as before for stents, can D/c aspirin. Continue beta blocker and statin. 3 Hyperlipidemia- on statin  4 Hypertension- as increasing toprol, would cut losartan to 50 mg/day.          Caleb Jackson MD, [facility]  The [address] at [facility]    Electronically signed by Caleb Jackson MD on 7/21/2022 at 11:02 AM

## 2022-07-21 NOTE — PROGRESS NOTES
Discharge instructions provided to patient, , and daughter. Information regarding medications, management of atrial fibrillation, and follow up appointments given. IV removed and dressing applied. Monitor removed, cleaned, and returned to nurses station. Importance of taking medications as prescribed discussed at length with verbalized understanding. Eliquis coupon sent with patient.

## 2022-07-30 ENCOUNTER — HOSPITAL ENCOUNTER (EMERGENCY)
Age: 87
Discharge: HOME | End: 2022-07-30
Payer: MEDICARE

## 2022-07-30 VITALS
TEMPERATURE: 97 F | RESPIRATION RATE: 15 BRPM | BODY MASS INDEX: 26.43 KG/M2 | WEIGHT: 140 LBS | SYSTOLIC BLOOD PRESSURE: 150 MMHG | HEART RATE: 62 BPM | DIASTOLIC BLOOD PRESSURE: 72 MMHG | OXYGEN SATURATION: 99 % | HEIGHT: 61 IN

## 2022-07-30 VITALS
DIASTOLIC BLOOD PRESSURE: 80 MMHG | BODY MASS INDEX: 26.43 KG/M2 | TEMPERATURE: 97.8 F | RESPIRATION RATE: 18 BRPM | HEART RATE: 63 BPM | SYSTOLIC BLOOD PRESSURE: 196 MMHG | HEIGHT: 61 IN | OXYGEN SATURATION: 100 % | WEIGHT: 140 LBS

## 2022-07-30 DIAGNOSIS — Z51.89 ENCOUNTER FOR WOUND RE-CHECK: Primary | ICD-10-CM

## 2022-07-30 DIAGNOSIS — Z51.89 VISIT FOR WOUND CHECK: Primary | ICD-10-CM

## 2022-07-30 PROCEDURE — 99282 EMERGENCY DEPT VISIT SF MDM: CPT

## 2022-07-30 ASSESSMENT — PAIN - FUNCTIONAL ASSESSMENT: PAIN_FUNCTIONAL_ASSESSMENT: NONE - DENIES PAIN

## 2022-07-30 NOTE — ED PROVIDER NOTES
[facility]   HPI:  7/30/22, Time: 1:16 AM EDT         Susana Ruiz is a 80 y.o. female presenting to the ED for Wound check , beginning 2100 . The complaint has been persistent, mild in severity, and worsened by nothing. Patient comes in with complaint bleeding from the surgical site that started around 2100. She is on Eliquis. 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 Review of Systems:   A complete review of systems was performed and pertinent positives and negatives are stated within HPI, all other systems reviewed and are negative.          --------------------------------------------- PAST HISTORY ---------------------------------------------  Past Medical History:  has a past medical history of CAD (coronary artery disease), Cancer ([facility] [address] Emphysema lung ([facility] [address] Hyperlipidemia, Hypertension, and Thyroid disease. Past Surgical History:  has a past surgical history that includes Hysterectomy; Parathyroid gland surgery; Coronary angioplasty with stent (2004); and Coronary angioplasty with stent (05/03/2018). Social History:  reports that she has never smoked. She has never used smokeless tobacco. She reports that she does not drink alcohol and does not use drugs. Family History: family history is not on file. The patients home medications have been reviewed. Allergies: Patient has no known allergies. -------------------------------------------------- RESULTS -------------------------------------------------  All laboratory and radiology results have been personally reviewed by myself   LABS:  No results found for this visit on 07/30/22. RADIOLOGY:  Interpreted by Radiologist.  No orders to display       ------------------------- NURSING NOTES AND VITALS REVIEWED ---------------------------   The nursing notes within the ED encounter and vital signs as below have been reviewed.    BP (!) 196/80   Pulse 63   Temp 97.8 °F (36.6 °C)   Resp 18   Ht 5' 1\" (1.549 m)   Wt 140 lb (63.5 kg)   SpO2 100%   BMI 26.45 kg/m²   Oxygen Saturation Interpretation: Normal      ---------------------------------------------------PHYSICAL EXAM--------------------------------------      Constitutional/General: Alert and oriented x3, well appearing, non toxic in NAD  Head: Normocephalic and atraumatic  Left-sided facial incision with small amount of bleeding from the proximal incisional site. Eyes: PERRL, EOMI  Mouth: Oropharynx clear, handling secretions, no trismus  Neck: Supple, full ROM,   Pulmonary: Lungs clear to auscultation bilaterally, no wheezes, rales, or rhonchi. Not in respiratory distress  Cardiovascular:  Regular rate and rhythm, no murmurs, gallops, or rubs. 2+ distal pulses  Abdomen: Soft, non tender, non distended,   Extremities: Moves all extremities x 4. Warm and well perfused  Skin: warm and dry without rash  Neurologic: GCS 15,  Psych: Normal Affect      ------------------------------ ED COURSE/MEDICAL DECISION MAKING----------------------  Medications - No data to display      ED COURSE:   Quick clot dressing applied     Medical Decision Making:     Patient came in with complaint of bleeding from surgical site. She had cancer removed from the face earlier today. Patient is on Eliquis. Quick clot dressing was applied and bleeding was controlled. She is to follow-up with her surgeon on Monday    Counseling: The emergency provider has spoken with the patient and discussed todays results, in addition to providing specific details for the plan of care and counseling regarding the diagnosis and prognosis. Questions are answered at this time and they are agreeable with the plan.      --------------------------------- IMPRESSION AND DISPOSITION ---------------------------------    IMPRESSION  No diagnosis found. DISPOSITION  Disposition: Discharge to home  Patient condition is good      NOTE: This report was transcribed using voice recognition software. Every effort was made to ensure accuracy; however, inadvertent computerized transcription errors may be present      Jeffrey Lo  07/30/22 0212

## 2022-07-30 NOTE — ED PROVIDER NOTES
[facility]  Department of Emergency Medicine   ED  Encounter Note  Admit Date/RoomTime: 2022  3:29 PM  ED Room:     NAME: Antonina Rey  : 1934  MRN: [id number]     Chief Complaint:  Other (Facial surgery yesterday. Bleeding at incision site. Takes eliquis and plavix. Seen last night and had dressing changed.)    History of Present Illness        Antonina Rey is a 80 y.o. old female who presents to the emergency department by private vehicle, for evaluation of biopsy site located on left side of her face, which occured 1 day(s) prior to arrival.  The wound was closed with sutures 1 days ago. . The wound is / has minimal, bloody drainage. Patient stated that she came in last night after having a cancerous lesion on her face removed and had a dressing placed to her biopsy site because it was bleeding. Patient states that she woke up this morning there was blood on the gauze so she took it off and she is continued to have oozing from the site. Patient is on Eliquis and Plavix. Denies any fevers or chills. Denies any lightheadedness,, headache, chest pain or shortness of breath. He denies any fevers or chills. Tetanus Status:  up to date. ROS   Pertinent positives and negatives are stated within HPI, all other systems reviewed and are negative. Past Medical History:  has a past medical history of CAD (coronary artery disease), Cancer ([address] Emphysema lung ([address] Hyperlipidemia, Hypertension, and Thyroid disease. Surgical History:  has a past surgical history that includes Hysterectomy; Parathyroid gland surgery; Coronary angioplasty with stent (); and Coronary angioplasty with stent (2018). Social History:  reports that she has never smoked. She has never used smokeless tobacco. She reports that she does not drink alcohol and does not use drugs. Family History: family history is not on file.      Allergies: Patient has no known allergies. Physical Exam   Oxygen Saturation Interpretation: Normal.        ED Triage Vitals [07/30/22 1528]   BP Temp Temp src Heart Rate Resp SpO2 Height Weight   (!) 150/72 97 °F (36.1 °C) -- 62 15 99 % 5' 1\" (1.549 m) 140 lb (63.5 kg)         Physical Exam  Constitutional:  Alert, development consistent with age. HEENT:  NC/NT. Airway patent. Neck:  Normal ROM. Supple. Respiratory:  Clear to auscultation and breath sounds equal.  CV:  Regular rate and rhythm, normal heart sounds, without pathological murmurs, ectopy, gallops, or rubs. GI:  Abdomen Soft, nontender, good bowel sounds. No firm or pulsatile mass. Back:  No costovertebral tenderness. Extremities: No tenderness or edema noted. Integument:  Normal turgor. Warm, dry, without visible rash, unless noted elsewhere. Incision site noted to the left side of the face with sutures in place. Scant amount of blood oozing from the proximal and in between 2 of the sutures. No surrounding erythema, warmth or lymphangitis noted. Lymphatics: No lymphangitis or adenopathy noted. Neurological:  Oriented. Motor functions intact. Lab / Imaging Results   (All laboratory and radiology results have been personally reviewed by myself)  Labs:  No results found for this visit on 07/30/22. Imaging: All Radiology results interpreted by Radiologist unless otherwise noted. No orders to display     ED Course / Medical Decision Making   Medications - No data to display     Re-examination:  7/30/22       Time: 1630   Hemostasis has been achieved. Patient's bleeding is under control and was monitored for approximately 30 minutes with no further bleeding noted. A dressing was placed. Consult(s):   None    Procedure(s):   None    MDM:   Patient presents to the emergency department for bleeding from her biopsy site that she had done yesterday. Patient removed the dressing that was put on yesterday, and states that it is continuing to ooze. Patient has a small amount of blood oozing from between the 2 sutures in the proximal end of the incision. A dab of adhesive was placed in between the 2 sutures. Patient was monitored, and bleeding remains controlled. A dressing was placed over the area. Patient educated on the signs and symptoms of wound infection, and what to monitor for at home. Patient verbalized understanding. Patient appears well, nontoxic in no acute distress. Other complaints concerns at this time. Plan is for discharge, and follow-up with dermatology as planned. Advised return the emergency department any worsening of symptoms. Patient advised to not pick at the site. At this time the patient is without objective evidence of an acute process requiring hospitalization or inpatient management. They have remained hemodynamically stable throughout their entire ED visit and are stable for discharge with outpatient follow-up. The plan has been discussed in detail and they are aware of the specific conditions for emergent return, as well as the importance of follow-up. Plan of Care/Counseling:  STACY Wiggins CNP reviewed today's visit with the patient in addition to providing specific details for the plan of care and counseling regarding the diagnosis and prognosis. Questions are answered at this time and are agreeable with the plan. Assessment     1. Encounter for wound re-check      Plan   Discharged home. Patient condition is good    New Medications     New Prescriptions    No medications on file     Electronically signed by STACY Wiggins CNP   DD: 7/30/22  **This report was transcribed using voice recognition software. Every effort was made to ensure accuracy; however, inadvertent computerized transcription errors may be present.   END OF ED PROVIDER NOTE      STACY Wiggins CNP  07/30/22 0901 [facility], APRN - CNP  07/30/22 2550

## 2022-08-01 NOTE — PROGRESS NOTES
Physician Progress Note      PATIENT:               Viola Alatorre  CSN #:                  [id number]  :                       1934  ADMIT DATE:       2022 10:39 AM  DISCH DATE:        2022 12:39 PM  RESPONDING  PROVIDER #:        Ghazal Ho MD          QUERY TEXT:    Patient admitted with New onset Atrial Fibrillation. If possible, please   document in progress notes and discharge summary if you are evaluating and/or   treating any of the following: The medical record reflects the following:  Risk Factors: Thyroid Disease; CAD;  Clinical Indicators:  22: per H&P: Atrial Fibrillation with RVR new onset  22: Per Cardiology consult; Paroxysmal atrial fibrillation--[id number]   score 5-recommend OAC, Eliquis 5mg  22: PT 12.2/INR 1.1; aPTT  <20; TSH w/out Reflex:  0.469  Treatment: Initiation of Eliquis; Thank Fanny OWUSUN, R.N.  Clinical Documentation Improvement  [phone]  Options provided:  -- Secondary hypercoagulable state in a patient with atrial fibrillation  -- Other - I will add my own diagnosis  -- Disagree - Not applicable / Not valid  -- Disagree - Clinically unable to determine / Unknown  -- Refer to Clinical Documentation Reviewer    PROVIDER RESPONSE TEXT:    Provider disagreed with this query.     Query created by: Wagner Rogel on 2022 9:10 AM      Electronically signed by:  Ghazal Ho MD 2022 9:50 AM

## 2022-08-11 NOTE — DISCHARGE SUMMARY
Physician Discharge Summary     Patient ID:  Balbir López  [id number]  80 y.o.  1934    Admit date: 7/19/2022    Discharge date and time: 7/21/2022 12:39 PM     Admission Diagnoses: Atrial fibrillation with RVR ([facility] [address] [I48.91]    Discharge Diagnoses:   Atrial fibrillation with rapid ventricular response as a new onset    Patient Active Problem List   Diagnosis    CAD in native artery    COPD (chronic obstructive pulmonary disease) (HCC)    HTN (hypertension), benign    Mixed hyperlipidemia    Status post insertion of drug-eluting stent into left anterior descending (LAD) artery    Inability to ambulate due to hip    Postural dizziness with presyncope    Orthostasis    Atrial fibrillation with RVR ([facility] [address]       Consults: Cardiology gen [facility]    Procedures:     Hospital Course:   26-year-old  woman admitted through emergency room due to palpitations as a result of new onset of atrial fibrillation with rapid ventricular response  Seen by cardiology  Converted to sinus rhythm with IV diltiazem which was discontinued subsequently  Eliquis was started  Metoprolol dose was increased  Losartan dose was decreased  She remained stable during the rest of the hospital course    Discharge Exam:  Patient was seen on the floor prior to discharge  In sinus rhythm and feeling better  No signs of CHF  Data reviewed in detail with her and the daughter at bedside    Disposition: Home  Stable at the time of discharge  Patient Instructions:   Discharge Medication List as of 7/21/2022 12:14 PM        CONTINUE these medications which have CHANGED    Details   apixaban (ELIQUIS) 5 MG TABS tablet Take 1 tablet by mouth in the morning and 1 tablet before bedtime. , Disp-60 tablet, R-5Normal      losartan (COZAAR) 50 MG tablet Take 1 tablet by mouth in the morning., Disp-30 tablet, R-3Normal      metoprolol succinate (TOPROL XL) 25 MG extended release tablet Take 1 tablet by mouth in the morning and at bedtime, Disp-30 tablet, R-3Normal           CONTINUE these medications which have NOT CHANGED    Details   potassium chloride (KLOR-CON M) 20 MEQ extended release tablet Take 20 mEq by mouth in the morning. Historical Med      predniSONE (DELTASONE) 10 MG tablet Take 10 mg by mouth See directions for remaining medication:  7/19 - 10 mg BID  7/20 - 7/22 - 10 mg daily   7/23 - 7/25 - 5 mg dailyHistorical Med      citalopram (CELEXA) 20 MG tablet Take 10 mg by mouth in the morning. Historical Med      clopidogrel (PLAVIX) 75 MG tablet Take 75 mg by mouth daily Historical Med      rosuvastatin (CRESTOR) 20 MG tablet Take 20 mg by mouth daily Historical Med      pantoprazole (PROTONIX) 40 MG tablet Take 40 mg by mouth daily Historical Med      acetaminophen (TYLENOL) 500 MG tablet Take 500 mg by mouth every 6 hours as needed for PainHistorical Med      amLODIPine (NORVASC) 5 MG tablet Take 5 mg by mouth dailyHistorical Med      calcitRIOL (ROCALTROL) 0.25 MCG capsule Take 0.25 mcg by mouth dailyHistorical Med      isosorbide mononitrate (IMDUR) 30 MG extended release tablet Take 30 mg by mouth dailyHistorical Med      LORazepam (ATIVAN) 0.5 MG tablet Take 0.25 mg by mouth 2 times daily.  Historical Med      levothyroxine (SYNTHROID) 88 MCG tablet Take 88 mcg by mouth DailyHistorical Med      magnesium oxide (MAG-OX) 400 MG tablet Take 400 mg by mouth dailyHistorical Med      Multiple Vitamins-Minerals (THERAPEUTIC MULTIVITAMIN-MINERALS) tablet Take 1 tablet by mouth dailyHistorical Med      calcium carbonate 600 MG TABS tablet Take 1 tablet by mouth 3 times dailyHistorical Med      Omega-3 Fatty Acids (FISH OIL) 435 MG CAPS Take 360 mg by mouth three times daily Historical Med           STOP taking these medications       naproxen sodium (ALEVE) 220 MG tablet Comments:   Reason for Stopping:             Activity: As tolerated  Diet: General    Follow-up with PCP and cardiology    Note that over 40 minutes was spent in preparing discharge papers, discussing discharge with patient, medication review, etc.    Signed:  Gatito Barcenas MD  8/10/2022  8:20 PM

## 2022-12-26 ENCOUNTER — APPOINTMENT (OUTPATIENT)
Dept: GENERAL RADIOLOGY | Age: 87
DRG: 309 | End: 2022-12-26
Payer: MEDICARE

## 2022-12-26 ENCOUNTER — HOSPITAL ENCOUNTER (INPATIENT)
Age: 87
LOS: 2 days | Discharge: HOME | DRG: 309 | End: 2022-12-29
Attending: EMERGENCY MEDICINE | Admitting: INTERNAL MEDICINE
Payer: MEDICARE

## 2022-12-26 DIAGNOSIS — N30.00 ACUTE CYSTITIS WITHOUT HEMATURIA: ICD-10-CM

## 2022-12-26 DIAGNOSIS — I48.91 ATRIAL FIBRILLATION WITH RAPID VENTRICULAR RESPONSE (HCC): Primary | ICD-10-CM

## 2022-12-26 LAB
ALBUMIN SERPL-MCNC: 4.8 G/DL (ref 3.5–5.2)
ALP BLD-CCNC: 84 U/L (ref 35–104)
ALT SERPL-CCNC: 19 U/L (ref 0–32)
ANION GAP SERPL CALCULATED.3IONS-SCNC: 17 MMOL/L (ref 7–16)
AST SERPL-CCNC: 24 U/L (ref 0–31)
BACTERIA: ABNORMAL /HPF
BASOPHILS ABSOLUTE: 0.05 E9/L (ref 0–0.2)
BASOPHILS RELATIVE PERCENT: 0.6 % (ref 0–2)
BILIRUB SERPL-MCNC: 0.3 MG/DL (ref 0–1.2)
BILIRUBIN URINE: NEGATIVE
BLOOD, URINE: ABNORMAL
BUN BLDV-MCNC: 23 MG/DL (ref 6–23)
CALCIUM SERPL-MCNC: 9.5 MG/DL (ref 8.6–10.2)
CHLORIDE BLD-SCNC: 105 MMOL/L (ref 98–107)
CLARITY: CLEAR
CO2: 18 MMOL/L (ref 22–29)
COLOR: YELLOW
CREAT SERPL-MCNC: 0.9 MG/DL (ref 0.5–1)
EOSINOPHILS ABSOLUTE: 0.28 E9/L (ref 0.05–0.5)
EOSINOPHILS RELATIVE PERCENT: 3.5 % (ref 0–6)
EPITHELIAL CELLS, UA: ABNORMAL /HPF
GFR SERPL CREATININE-BSD FRML MDRD: >60 ML/MIN/1.73
GLUCOSE BLD-MCNC: 188 MG/DL (ref 74–99)
GLUCOSE URINE: NEGATIVE MG/DL
HCT VFR BLD CALC: 40.8 % (ref 34–48)
HEMOGLOBIN: 13.6 G/DL (ref 11.5–15.5)
IMMATURE GRANULOCYTES #: 0.02 E9/L
IMMATURE GRANULOCYTES %: 0.3 % (ref 0–5)
INR BLD: 1
KETONES, URINE: NEGATIVE MG/DL
LEUKOCYTE ESTERASE, URINE: ABNORMAL
LYMPHOCYTES ABSOLUTE: 2.33 E9/L (ref 1.5–4)
LYMPHOCYTES RELATIVE PERCENT: 29.3 % (ref 20–42)
MCH RBC QN AUTO: 28.6 PG (ref 26–35)
MCHC RBC AUTO-ENTMCNC: 33.3 % (ref 32–34.5)
MCV RBC AUTO: 85.9 FL (ref 80–99.9)
MONOCYTES ABSOLUTE: 0.76 E9/L (ref 0.1–0.95)
MONOCYTES RELATIVE PERCENT: 9.6 % (ref 2–12)
NEUTROPHILS ABSOLUTE: 4.5 E9/L (ref 1.8–7.3)
NEUTROPHILS RELATIVE PERCENT: 56.7 % (ref 43–80)
NITRITE, URINE: POSITIVE
PDW BLD-RTO: 13.2 FL (ref 11.5–15)
PH UA: 6.5 (ref 5–9)
PLATELET # BLD: 290 E9/L (ref 130–450)
PMV BLD AUTO: 10.8 FL (ref 7–12)
POTASSIUM SERPL-SCNC: 3.6 MMOL/L (ref 3.5–5)
PRO-BNP: 450 PG/ML (ref 0–450)
PROTEIN UA: 30 MG/DL
PROTHROMBIN TIME: 11.4 SEC (ref 9.3–12.4)
RBC # BLD: 4.75 E12/L (ref 3.5–5.5)
RBC UA: ABNORMAL /HPF (ref 0–2)
SODIUM BLD-SCNC: 140 MMOL/L (ref 132–146)
SPECIFIC GRAVITY UA: <=1.005 (ref 1–1.03)
TOTAL PROTEIN: 7.7 G/DL (ref 6.4–8.3)
TROPONIN, HIGH SENSITIVITY: 21 NG/L (ref 0–9)
UROBILINOGEN, URINE: 0.2 E.U./DL
WBC # BLD: 7.9 E9/L (ref 4.5–11.5)
WBC UA: ABNORMAL /HPF (ref 0–5)

## 2022-12-26 PROCEDURE — 96375 TX/PRO/DX INJ NEW DRUG ADDON: CPT

## 2022-12-26 PROCEDURE — 99285 EMERGENCY DEPT VISIT HI MDM: CPT

## 2022-12-26 PROCEDURE — 6360000002 HC RX W HCPCS

## 2022-12-26 PROCEDURE — 96365 THER/PROPH/DIAG IV INF INIT: CPT

## 2022-12-26 PROCEDURE — 87186 SC STD MICRODIL/AGAR DIL: CPT

## 2022-12-26 PROCEDURE — 2580000003 HC RX 258

## 2022-12-26 PROCEDURE — 83880 ASSAY OF NATRIURETIC PEPTIDE: CPT

## 2022-12-26 PROCEDURE — 80053 COMPREHEN METABOLIC PANEL: CPT

## 2022-12-26 PROCEDURE — 93005 ELECTROCARDIOGRAM TRACING: CPT

## 2022-12-26 PROCEDURE — 84484 ASSAY OF TROPONIN QUANT: CPT

## 2022-12-26 PROCEDURE — 85025 COMPLETE CBC W/AUTO DIFF WBC: CPT

## 2022-12-26 PROCEDURE — 87088 URINE BACTERIA CULTURE: CPT

## 2022-12-26 PROCEDURE — 71045 X-RAY EXAM CHEST 1 VIEW: CPT

## 2022-12-26 PROCEDURE — 85610 PROTHROMBIN TIME: CPT

## 2022-12-26 PROCEDURE — 81001 URINALYSIS AUTO W/SCOPE: CPT

## 2022-12-26 RX ORDER — 0.9 % SODIUM CHLORIDE 0.9 %
1000 INTRAVENOUS SOLUTION INTRAVENOUS ONCE
Status: COMPLETED | OUTPATIENT
Start: 2022-12-26 | End: 2022-12-27

## 2022-12-26 RX ORDER — 0.9 % SODIUM CHLORIDE 0.9 %
1000 INTRAVENOUS SOLUTION INTRAVENOUS ONCE
Status: COMPLETED | OUTPATIENT
Start: 2022-12-26 | End: 2022-12-26

## 2022-12-26 RX ORDER — MAGNESIUM SULFATE IN WATER 40 MG/ML
2000 INJECTION, SOLUTION INTRAVENOUS ONCE
Status: COMPLETED | OUTPATIENT
Start: 2022-12-26 | End: 2022-12-26

## 2022-12-26 RX ADMIN — SODIUM CHLORIDE 1000 ML: 9 INJECTION, SOLUTION INTRAVENOUS at 21:27

## 2022-12-26 RX ADMIN — MAGNESIUM SULFATE HEPTAHYDRATE 2000 MG: 40 INJECTION, SOLUTION INTRAVENOUS at 21:51

## 2022-12-26 ASSESSMENT — PAIN - FUNCTIONAL ASSESSMENT: PAIN_FUNCTIONAL_ASSESSMENT: NONE - DENIES PAIN

## 2022-12-26 ASSESSMENT — ENCOUNTER SYMPTOMS
EYE DISCHARGE: 0
ABDOMINAL PAIN: 0
BACK PAIN: 0
ABDOMINAL DISTENTION: 0
SHORTNESS OF BREATH: 0
COLOR CHANGE: 0
EYE ITCHING: 0
CHEST TIGHTNESS: 0
FACIAL SWELLING: 0

## 2022-12-27 PROBLEM — I48.91 A-FIB (HCC): Status: ACTIVE | Noted: 2022-12-27

## 2022-12-27 PROBLEM — E86.0 DEHYDRATION: Status: ACTIVE | Noted: 2022-12-27

## 2022-12-27 PROBLEM — N39.0 UTI (URINARY TRACT INFECTION): Status: ACTIVE | Noted: 2022-12-27

## 2022-12-27 LAB
ANION GAP SERPL CALCULATED.3IONS-SCNC: 13 MMOL/L (ref 7–16)
BASOPHILS ABSOLUTE: 0.03 E9/L (ref 0–0.2)
BASOPHILS RELATIVE PERCENT: 0.4 % (ref 0–2)
BUN BLDV-MCNC: 17 MG/DL (ref 6–23)
CALCIUM SERPL-MCNC: 8.5 MG/DL (ref 8.6–10.2)
CHLORIDE BLD-SCNC: 108 MMOL/L (ref 98–107)
CO2: 20 MMOL/L (ref 22–29)
CREAT SERPL-MCNC: 0.7 MG/DL (ref 0.5–1)
EKG ATRIAL RATE: 220 BPM
EKG Q-T INTERVAL: 334 MS
EKG QRS DURATION: 68 MS
EKG QTC CALCULATION (BAZETT): 482 MS
EKG R AXIS: 21 DEGREES
EKG T AXIS: 55 DEGREES
EKG VENTRICULAR RATE: 125 BPM
EOSINOPHILS ABSOLUTE: 0.19 E9/L (ref 0.05–0.5)
EOSINOPHILS RELATIVE PERCENT: 2.5 % (ref 0–6)
GFR SERPL CREATININE-BSD FRML MDRD: >60 ML/MIN/1.73
GLUCOSE BLD-MCNC: 124 MG/DL (ref 74–99)
HCT VFR BLD CALC: 39.3 % (ref 34–48)
HEMOGLOBIN: 13.3 G/DL (ref 11.5–15.5)
IMMATURE GRANULOCYTES #: 0.02 E9/L
IMMATURE GRANULOCYTES %: 0.3 % (ref 0–5)
LYMPHOCYTES ABSOLUTE: 1.82 E9/L (ref 1.5–4)
LYMPHOCYTES RELATIVE PERCENT: 24.2 % (ref 20–42)
MCH RBC QN AUTO: 29.1 PG (ref 26–35)
MCHC RBC AUTO-ENTMCNC: 33.8 % (ref 32–34.5)
MCV RBC AUTO: 86 FL (ref 80–99.9)
MONOCYTES ABSOLUTE: 0.7 E9/L (ref 0.1–0.95)
MONOCYTES RELATIVE PERCENT: 9.3 % (ref 2–12)
NEUTROPHILS ABSOLUTE: 4.77 E9/L (ref 1.8–7.3)
NEUTROPHILS RELATIVE PERCENT: 63.3 % (ref 43–80)
PDW BLD-RTO: 13.3 FL (ref 11.5–15)
PLATELET # BLD: 261 E9/L (ref 130–450)
PMV BLD AUTO: 10.5 FL (ref 7–12)
POTASSIUM REFLEX MAGNESIUM: 3.6 MMOL/L (ref 3.5–5)
RBC # BLD: 4.57 E12/L (ref 3.5–5.5)
SODIUM BLD-SCNC: 141 MMOL/L (ref 132–146)
TROPONIN, HIGH SENSITIVITY: 21 NG/L (ref 0–9)
TROPONIN, HIGH SENSITIVITY: 26 NG/L (ref 0–9)
WBC # BLD: 7.5 E9/L (ref 4.5–11.5)

## 2022-12-27 PROCEDURE — 6360000002 HC RX W HCPCS: Performed by: INTERNAL MEDICINE

## 2022-12-27 PROCEDURE — 6360000002 HC RX W HCPCS

## 2022-12-27 PROCEDURE — 2580000003 HC RX 258: Performed by: NURSE PRACTITIONER

## 2022-12-27 PROCEDURE — 93010 ELECTROCARDIOGRAM REPORT: CPT | Performed by: INTERNAL MEDICINE

## 2022-12-27 PROCEDURE — 85025 COMPLETE CBC W/AUTO DIFF WBC: CPT

## 2022-12-27 PROCEDURE — 2140000000 HC CCU INTERMEDIATE R&B

## 2022-12-27 PROCEDURE — 2580000003 HC RX 258

## 2022-12-27 PROCEDURE — 80048 BASIC METABOLIC PNL TOTAL CA: CPT

## 2022-12-27 PROCEDURE — 6360000002 HC RX W HCPCS: Performed by: NURSE PRACTITIONER

## 2022-12-27 PROCEDURE — 2500000003 HC RX 250 WO HCPCS: Performed by: INTERNAL MEDICINE

## 2022-12-27 PROCEDURE — 84484 ASSAY OF TROPONIN QUANT: CPT

## 2022-12-27 PROCEDURE — 2500000003 HC RX 250 WO HCPCS

## 2022-12-27 PROCEDURE — 6370000000 HC RX 637 (ALT 250 FOR IP): Performed by: NURSE PRACTITIONER

## 2022-12-27 PROCEDURE — 2500000003 HC RX 250 WO HCPCS: Performed by: NURSE PRACTITIONER

## 2022-12-27 RX ORDER — PANTOPRAZOLE SODIUM 40 MG/1
40 TABLET, DELAYED RELEASE ORAL DAILY
Status: DISCONTINUED | OUTPATIENT
Start: 2022-12-27 | End: 2022-12-29 | Stop reason: HOSPADM

## 2022-12-27 RX ORDER — DIGOXIN 0.25 MG/ML
250 INJECTION INTRAMUSCULAR; INTRAVENOUS ONCE
Status: COMPLETED | OUTPATIENT
Start: 2022-12-27 | End: 2022-12-27

## 2022-12-27 RX ORDER — CALCITRIOL 0.25 UG/1
0.25 CAPSULE, LIQUID FILLED ORAL DAILY
Status: DISCONTINUED | OUTPATIENT
Start: 2022-12-27 | End: 2022-12-29 | Stop reason: HOSPADM

## 2022-12-27 RX ORDER — M-VIT,TX,IRON,MINS/CALC/FOLIC 27MG-0.4MG
1 TABLET ORAL DAILY
Status: DISCONTINUED | OUTPATIENT
Start: 2022-12-27 | End: 2022-12-29 | Stop reason: HOSPADM

## 2022-12-27 RX ORDER — AMLODIPINE BESYLATE 5 MG/1
5 TABLET ORAL DAILY
Status: DISCONTINUED | OUTPATIENT
Start: 2022-12-27 | End: 2022-12-28

## 2022-12-27 RX ORDER — POLYETHYLENE GLYCOL 3350 17 G/17G
17 POWDER, FOR SOLUTION ORAL DAILY PRN
Status: DISCONTINUED | OUTPATIENT
Start: 2022-12-27 | End: 2022-12-29 | Stop reason: HOSPADM

## 2022-12-27 RX ORDER — SODIUM CHLORIDE 9 MG/ML
INJECTION, SOLUTION INTRAVENOUS PRN
Status: DISCONTINUED | OUTPATIENT
Start: 2022-12-27 | End: 2022-12-29 | Stop reason: HOSPADM

## 2022-12-27 RX ORDER — LOSARTAN POTASSIUM 50 MG/1
50 TABLET ORAL DAILY
Status: DISCONTINUED | OUTPATIENT
Start: 2022-12-27 | End: 2022-12-29 | Stop reason: HOSPADM

## 2022-12-27 RX ORDER — METOPROLOL TARTRATE 5 MG/5ML
2.5 INJECTION INTRAVENOUS EVERY 6 HOURS
Status: DISCONTINUED | OUTPATIENT
Start: 2022-12-27 | End: 2022-12-29

## 2022-12-27 RX ORDER — ROSUVASTATIN CALCIUM 20 MG/1
20 TABLET, COATED ORAL NIGHTLY
Status: DISCONTINUED | OUTPATIENT
Start: 2022-12-27 | End: 2022-12-29 | Stop reason: HOSPADM

## 2022-12-27 RX ORDER — CITALOPRAM 10 MG/1
10 TABLET ORAL DAILY
Status: DISCONTINUED | OUTPATIENT
Start: 2022-12-27 | End: 2022-12-29 | Stop reason: HOSPADM

## 2022-12-27 RX ORDER — POTASSIUM CHLORIDE 20 MEQ/1
20 TABLET, EXTENDED RELEASE ORAL DAILY
Status: DISCONTINUED | OUTPATIENT
Start: 2022-12-27 | End: 2022-12-29 | Stop reason: HOSPADM

## 2022-12-27 RX ORDER — LANOLIN ALCOHOL/MO/W.PET/CERES
400 CREAM (GRAM) TOPICAL DAILY
Status: DISCONTINUED | OUTPATIENT
Start: 2022-12-27 | End: 2022-12-29 | Stop reason: HOSPADM

## 2022-12-27 RX ORDER — DILTIAZEM HYDROCHLORIDE 5 MG/ML
15 INJECTION INTRAVENOUS ONCE
Status: COMPLETED | OUTPATIENT
Start: 2022-12-27 | End: 2022-12-27

## 2022-12-27 RX ORDER — METOPROLOL SUCCINATE 25 MG/1
25 TABLET, EXTENDED RELEASE ORAL 2 TIMES DAILY
Status: DISCONTINUED | OUTPATIENT
Start: 2022-12-27 | End: 2022-12-29 | Stop reason: HOSPADM

## 2022-12-27 RX ORDER — CALCIUM CARBONATE 200(500)MG
500 TABLET,CHEWABLE ORAL 3 TIMES DAILY
Status: DISCONTINUED | OUTPATIENT
Start: 2022-12-27 | End: 2022-12-29 | Stop reason: HOSPADM

## 2022-12-27 RX ORDER — ENOXAPARIN SODIUM 100 MG/ML
1 INJECTION SUBCUTANEOUS 2 TIMES DAILY
Status: DISCONTINUED | OUTPATIENT
Start: 2022-12-27 | End: 2022-12-29 | Stop reason: HOSPADM

## 2022-12-27 RX ORDER — SODIUM CHLORIDE 9 MG/ML
INJECTION, SOLUTION INTRAVENOUS CONTINUOUS
Status: DISCONTINUED | OUTPATIENT
Start: 2022-12-27 | End: 2022-12-29 | Stop reason: HOSPADM

## 2022-12-27 RX ORDER — GAUZE BANDAGE 4" X 4"
360 BANDAGE TOPICAL 3 TIMES DAILY
Status: DISCONTINUED | OUTPATIENT
Start: 2022-12-27 | End: 2022-12-27 | Stop reason: CLARIF

## 2022-12-27 RX ORDER — SODIUM CHLORIDE 0.9 % (FLUSH) 0.9 %
5-40 SYRINGE (ML) INJECTION EVERY 12 HOURS SCHEDULED
Status: DISCONTINUED | OUTPATIENT
Start: 2022-12-27 | End: 2022-12-29 | Stop reason: HOSPADM

## 2022-12-27 RX ORDER — ISOSORBIDE MONONITRATE 30 MG/1
30 TABLET, EXTENDED RELEASE ORAL DAILY
Status: DISCONTINUED | OUTPATIENT
Start: 2022-12-27 | End: 2022-12-29 | Stop reason: HOSPADM

## 2022-12-27 RX ORDER — ACETAMINOPHEN 650 MG/1
650 SUPPOSITORY RECTAL EVERY 6 HOURS PRN
Status: DISCONTINUED | OUTPATIENT
Start: 2022-12-27 | End: 2022-12-29 | Stop reason: HOSPADM

## 2022-12-27 RX ORDER — METOPROLOL TARTRATE 5 MG/5ML
5 INJECTION INTRAVENOUS ONCE
Status: COMPLETED | OUTPATIENT
Start: 2022-12-27 | End: 2022-12-27

## 2022-12-27 RX ORDER — SODIUM CHLORIDE 0.9 % (FLUSH) 0.9 %
5-40 SYRINGE (ML) INJECTION PRN
Status: DISCONTINUED | OUTPATIENT
Start: 2022-12-27 | End: 2022-12-29 | Stop reason: HOSPADM

## 2022-12-27 RX ORDER — LEVOTHYROXINE SODIUM 88 UG/1
88 TABLET ORAL DAILY
Status: DISCONTINUED | OUTPATIENT
Start: 2022-12-27 | End: 2022-12-29 | Stop reason: HOSPADM

## 2022-12-27 RX ORDER — CLOPIDOGREL BISULFATE 75 MG/1
75 TABLET ORAL DAILY
Status: DISCONTINUED | OUTPATIENT
Start: 2022-12-27 | End: 2022-12-29 | Stop reason: HOSPADM

## 2022-12-27 RX ORDER — ACETAMINOPHEN 325 MG/1
650 TABLET ORAL EVERY 6 HOURS PRN
Status: DISCONTINUED | OUTPATIENT
Start: 2022-12-27 | End: 2022-12-29 | Stop reason: HOSPADM

## 2022-12-27 RX ADMIN — CALCIUM CARBONATE 500 MG: 500 TABLET, CHEWABLE ORAL at 14:48

## 2022-12-27 RX ADMIN — CALCITRIOL CAPSULES 0.25 MCG 0.25 MCG: 0.25 CAPSULE ORAL at 10:49

## 2022-12-27 RX ADMIN — MULTIPLE VITAMINS W/ MINERALS TAB 1 TABLET: TAB at 07:55

## 2022-12-27 RX ADMIN — LEVOTHYROXINE SODIUM 88 MCG: 0.09 TABLET ORAL at 10:50

## 2022-12-27 RX ADMIN — METOPROLOL TARTRATE 2.5 MG: 5 INJECTION, SOLUTION INTRAVENOUS at 12:34

## 2022-12-27 RX ADMIN — ROSUVASTATIN 20 MG: 20 TABLET, FILM COATED ORAL at 20:25

## 2022-12-27 RX ADMIN — DIGOXIN 250 MCG: 0.25 INJECTION INTRAMUSCULAR; INTRAVENOUS at 21:54

## 2022-12-27 RX ADMIN — ENOXAPARIN SODIUM 60 MG: 100 INJECTION SUBCUTANEOUS at 04:06

## 2022-12-27 RX ADMIN — CALCIUM CARBONATE 500 MG: 500 TABLET, CHEWABLE ORAL at 10:49

## 2022-12-27 RX ADMIN — CLOPIDOGREL BISULFATE 75 MG: 75 TABLET ORAL at 07:44

## 2022-12-27 RX ADMIN — DIGOXIN 250 MCG: 0.25 INJECTION INTRAMUSCULAR; INTRAVENOUS at 14:45

## 2022-12-27 RX ADMIN — DEXTROSE MONOHYDRATE 15 MG/HR: 50 INJECTION, SOLUTION INTRAVENOUS at 17:57

## 2022-12-27 RX ADMIN — SODIUM CHLORIDE 1000 ML: 9 INJECTION, SOLUTION INTRAVENOUS at 00:40

## 2022-12-27 RX ADMIN — DILTIAZEM HYDROCHLORIDE 15 MG: 5 INJECTION INTRAVENOUS at 17:55

## 2022-12-27 RX ADMIN — METOPROLOL TARTRATE 5 MG: 5 INJECTION, SOLUTION INTRAVENOUS at 00:45

## 2022-12-27 RX ADMIN — POTASSIUM CHLORIDE 20 MEQ: 1500 TABLET, EXTENDED RELEASE ORAL at 07:44

## 2022-12-27 RX ADMIN — CALCIUM CARBONATE 500 MG: 500 TABLET, CHEWABLE ORAL at 20:25

## 2022-12-27 RX ADMIN — CEFTRIAXONE 1000 MG: 1 INJECTION, POWDER, FOR SOLUTION INTRAMUSCULAR; INTRAVENOUS at 21:56

## 2022-12-27 RX ADMIN — METOPROLOL SUCCINATE 25 MG: 25 TABLET, EXTENDED RELEASE ORAL at 20:25

## 2022-12-27 RX ADMIN — AMLODIPINE BESYLATE 5 MG: 5 TABLET ORAL at 10:50

## 2022-12-27 RX ADMIN — METOPROLOL TARTRATE 2.5 MG: 5 INJECTION, SOLUTION INTRAVENOUS at 06:59

## 2022-12-27 RX ADMIN — ISOSORBIDE MONONITRATE 30 MG: 30 TABLET, EXTENDED RELEASE ORAL at 07:44

## 2022-12-27 RX ADMIN — Medication 400 MG: at 07:55

## 2022-12-27 RX ADMIN — CEFTRIAXONE 2000 MG: 2 INJECTION, POWDER, FOR SOLUTION INTRAMUSCULAR; INTRAVENOUS at 00:31

## 2022-12-27 RX ADMIN — ENOXAPARIN SODIUM 60 MG: 100 INJECTION SUBCUTANEOUS at 07:44

## 2022-12-27 RX ADMIN — CITALOPRAM HYDROBROMIDE 10 MG: 10 TABLET, FILM COATED ORAL at 07:55

## 2022-12-27 RX ADMIN — LOSARTAN POTASSIUM 50 MG: 50 TABLET, FILM COATED ORAL at 07:44

## 2022-12-27 RX ADMIN — SODIUM CHLORIDE: 9 INJECTION, SOLUTION INTRAVENOUS at 04:01

## 2022-12-27 RX ADMIN — METOPROLOL SUCCINATE 25 MG: 25 TABLET, EXTENDED RELEASE ORAL at 10:49

## 2022-12-27 RX ADMIN — ENOXAPARIN SODIUM 60 MG: 100 INJECTION SUBCUTANEOUS at 20:27

## 2022-12-27 RX ADMIN — PANTOPRAZOLE SODIUM 40 MG: 40 TABLET, DELAYED RELEASE ORAL at 07:45

## 2022-12-27 RX ADMIN — METOPROLOL TARTRATE 2.5 MG: 5 INJECTION, SOLUTION INTRAVENOUS at 20:21

## 2022-12-27 RX ADMIN — Medication 10 ML: at 20:28

## 2022-12-27 ASSESSMENT — LIFESTYLE VARIABLES: HOW OFTEN DO YOU HAVE A DRINK CONTAINING ALCOHOL: NEVER

## 2022-12-27 NOTE — H&P
[facility]  History and Physical      CHIEF COMPLAINT:    Chief Complaint   Patient presents with    Palpitations     PT complaints headache x 1 wk and BP running high at MD office. PT reports nausea and fatigue. Called EMS because \"felt heart racing and shakiness. \"  PT reports MD taking her off eliquis this month        Patient of Ector Kelsey MD presents with:  Atrial fibrillation with RVR ([facility] [address]    History of Present Illness:   Patient is an 44-year-old female with a past medical history of CAD, emphysema, HLD, HTN, hypothyroidism, atrial fibrillation who presents emergency room for headache x1 week with an elevated BP. Patient with PT by her primary care doctor when she was found to have an elevated BP. She states she was recently taken off of her Eliquis this month by cardiology or PCPa doc  she doesn't know it was by cards or pcp -. She states later around 7 PM she started having palpitations in which she called EMS. She was found to be in atrial fibrillation with RVR. Chest x-ray was obtained on arrival which was negative for anything acute. Labs were relatively unremarkable given patient's age and comorbidities however she was found to have a mild UTI. Patient is to be admitted to intermediate telemetry for further work-up with cardiology. On evaluation she is comfortable in no apparent acute distress and indicates she feels better now that her heart rate has slowed      REVIEW OF SYSTEMS:  Pertinent negatives are above in HPI. 10 point ROS otherwise negative.       Past Medical History:   Diagnosis Date    CAD (coronary artery disease)     Cancer ([facility] [address]     skin cancer    Emphysema lung ([facility] [address]     Hyperlipidemia     Hypertension     Thyroid disease          Past Surgical History:   Procedure Laterality Date    CORONARY ANGIOPLASTY WITH STENT PLACEMENT  2004    stents x 2    CORONARY ANGIOPLASTY WITH STENT PLACEMENT  05/03/2018    Dr. Brian Feng- [place] [place] 2.5 x 30 Mid LAD HYSTERECTOMY (CERVIX STATUS UNKNOWN)      PARATHYROID GLAND SURGERY         Medications Prior to Admission:    Not in a hospital admission. Note that the patient's home medications were reviewed and the above list is accurate to the best of my knowledge at the time of the exam.    Allergies:    Patient has no known allergies. Social History:    reports that she has never smoked. She has never used smokeless tobacco. She reports that she does not drink alcohol and does not use drugs. Family History:   family history is not on file. PHYSICAL EXAM:    Vitals:  BP (!) 111/90   Pulse (!) 108   Temp 97.4 °F (36.3 °C) (Oral)   Resp 21   Ht 5' 1\" (1.549 m)   Wt 141 lb (64 kg)   SpO2 98%   BMI 26.64 kg/m²       General appearance: NAD, conversant  Eyes: Sclerae anicteric, PERRLA  HEENT: AT/NC, MMM  Neck: FROM, supple, no thyromegaly  Lymph: No cervical / supraclavicular lymphadenopathy  Lungs: Clear to auscultation, WOB normal  CV: Irregular, rate controlled, no MRGs, no lower extremity edema  Abdomen: Soft, non-tender; no masses or HSM, +BS  Extremities: FROM without synovitis. No clubbing or cyanosis of the hands. Skin: no rash, induration, lesions, or ulcers  Psych: Calm and cooperative. Normal judgement and insight. Normal mood and affect. Neuro: Alert and interactive, face symmetric, speech fluent. LABS:  All labs reviewed.   Of note:  CBC:   Lab Results   Component Value Date/Time    WBC 7.5 12/27/2022 05:11 AM    RBC 4.57 12/27/2022 05:11 AM    HGB 13.3 12/27/2022 05:11 AM    HCT 39.3 12/27/2022 05:11 AM    MCV 86.0 12/27/2022 05:11 AM    MCH 29.1 12/27/2022 05:11 AM    MCHC 33.8 12/27/2022 05:11 AM    RDW 13.3 12/27/2022 05:11 AM     12/27/2022 05:11 AM    MPV 10.5 12/27/2022 05:11 AM     CMP:    Lab Results   Component Value Date/Time     12/27/2022 05:11 AM    K 3.6 12/27/2022 05:11 AM     12/27/2022 05:11 AM    CO2 20 12/27/2022 05:11 AM    BUN 17 12/27/2022 05:11 AM

## 2022-12-27 NOTE — ACP (ADVANCE CARE PLANNING)
Advance Care Planning   Healthcare Decision Maker:    Primary Decision Maker: Jhon Mark - Spouse - [phone]    Secondary Decision Maker: Eddie Anderson - Child - [phone]    Click here to complete Healthcare Decision Makers including selection of the Healthcare Decision Maker Relationship (ie \"Primary\"). Today we documented Decision Maker(s) consistent with Legal Next of Kin hierarchy.        If the relationship to the patient does NOT follow our state's Next of Kin hierarchy, the patient MUST complete an ACP Document to allow him/her to act on the patient's behalf. :

## 2022-12-27 NOTE — ED PROVIDER NOTES
Ashly Barbour 80 y.o. female PHMx of chronic atrial fibrillation, reports she was on Eliquis long-term but recently stopped taking it per her doctor, mixed hyperlipidemia, hypertension, CO PD, CAD presents to the ED c/o atrial fibrillation and palpitations. Onset: Around 7 PM. Location/Radiation: Generalized in the chest. Duration: Constant. Characterization: Racing heart. Aggravating Factors: UTI. Relieving Factors: None. Severity: Mild to moderate. Assx Sxs: Palpitations, racing heart, dysuria. She/He Denies: Fever/chills, abdominal pain, nausea/vomiting, chest pain, shortness of breath. Review of Systems   Constitutional:  Negative for activity change and appetite change. HENT:  Negative for congestion and facial swelling. Eyes:  Negative for discharge and itching. Respiratory:  Negative for chest tightness and shortness of breath. Cardiovascular:  Positive for palpitations. Negative for chest pain. Gastrointestinal:  Negative for abdominal distention and abdominal pain. Endocrine: Negative for cold intolerance and heat intolerance. Genitourinary:  Negative for dysuria and enuresis. Musculoskeletal:  Negative for arthralgias and back pain. Skin:  Negative for color change and pallor. Allergic/Immunologic: Negative for environmental allergies and food allergies. Neurological:  Negative for dizziness and facial asymmetry. Hematological:  Negative for adenopathy. Does not bruise/bleed easily. Psychiatric/Behavioral:  Negative for agitation and behavioral problems. Physical Exam  Vitals reviewed. Constitutional:       General: She is not in acute distress. HENT:      Head: Normocephalic. Right Ear: External ear normal.      Left Ear: External ear normal.      Nose: Nose normal.   Eyes:      General:         Right eye: No discharge. Left eye: No discharge.       Conjunctiva/sclera: Conjunctivae normal.   Cardiovascular:      Rate and Rhythm: Normal rate and regular rhythm. Pulses: Normal pulses. Heart sounds: Normal heart sounds. No friction rub. No gallop. Pulmonary:      Effort: Pulmonary effort is normal. No respiratory distress. Breath sounds: Normal breath sounds. No stridor. Abdominal:      Palpations: Abdomen is soft. Tenderness: There is no abdominal tenderness. There is no guarding or rebound. Musculoskeletal:         General: No deformity or signs of injury. Cervical back: Normal range of motion. No rigidity. Skin:     General: Skin is warm and dry. Capillary Refill: Capillary refill takes less than 2 seconds. Coloration: Skin is not jaundiced or pale. Neurological:      General: No focal deficit present. Mental Status: She is alert and oriented to person, place, and time. Cranial Nerves: No cranial nerve deficit. Sensory: No sensory deficit. Motor: No weakness. Psychiatric:         Mood and Affect: Mood normal.         Behavior: Behavior normal.        Procedures     MDM  Number of Diagnoses or Management Options  Acute cystitis without hematuria  Atrial fibrillation with rapid ventricular response (HCC)  Diagnosis management comments: This is a very pleasant 80-year-old female that presents to the ER complaining of palpitations/racing heart from home. Upon evaluation patient she was AOx4, normotensive, tachycardic, nonhypoxic on room air, nontoxic-appearing, hemodynamically stable, cranial nerves II to XII grossly intact, no focal deficits. She had clear bilateral breath sounds, no wheezing/rales/rhonchi. She is EKG shows atrial fibrillation with RVR. She has a history of this and is recently been taken off Eliquis, she is not a Her electrolytes including potassium, kidney function, liver function were within acceptable limits. No leukocytosis, H&H within acceptable limits. Chest x-ray showed no acute cardiopulmonary process.   Patient given 2 L of IV fluids, 2 g of IV ceftriaxone, and 2 g of IV magnesium. She was also given 5 mg of Lopressor IV, which is her home dose. Her blood pressure remained normotensive during her ER stay and her heart rate normalized. Discussed case with patient's primary care team they agreed admit the patient for further evaluation work-up. Patient stable at time of admission. candidate for defibrillation. Laboratory evaluation was consistent with acute cystitis.                --------------------------------------------- PAST HISTORY ---------------------------------------------  Past Medical History:  has a past medical history of CAD (coronary artery disease), Cancer ([facility] [address] Emphysema lung ([facility] [address] Hyperlipidemia, Hypertension, and Thyroid disease. Past Surgical History:  has a past surgical history that includes Hysterectomy; Parathyroid gland surgery; Coronary angioplasty with stent (2004); and Coronary angioplasty with stent (05/03/2018). Social History:  reports that she has never smoked. She has never used smokeless tobacco. She reports that she does not drink alcohol and does not use drugs. Family History: family history is not on file. The patients home medications have been reviewed. Allergies: Patient has no known allergies.     -------------------------------------------------- RESULTS -------------------------------------------------    LABS:  Results for orders placed or performed during the hospital encounter of 12/26/22   CMP   Result Value Ref Range    Sodium 140 132 - 146 mmol/L    Potassium 3.6 3.5 - 5.0 mmol/L    Chloride 105 98 - 107 mmol/L    CO2 18 (L) 22 - 29 mmol/L    Anion Gap 17 (H) 7 - 16 mmol/L    Glucose 188 (H) 74 - 99 mg/dL    BUN 23 6 - 23 mg/dL    Creatinine 0.9 0.5 - 1.0 mg/dL    Est, Glom Filt Rate >60 >=60 mL/min/1.73    Calcium 9.5 8.6 - 10.2 mg/dL    Total Protein 7.7 6.4 - 8.3 g/dL    Albumin 4.8 3.5 - 5.2 g/dL    Total Bilirubin 0.3 0.0 - 1.2 mg/dL    Alkaline Phosphatase 84 35 - 104 U/L    ALT 19 0 - 32 U/L    AST 24 0 - 31 U/L   CBC with Auto Differential   Result Value Ref Range    WBC 7.9 4.5 - 11.5 E9/L    RBC 4.75 3.50 - 5.50 E12/L    Hemoglobin 13.6 11.5 - 15.5 g/dL    Hematocrit 40.8 34.0 - 48.0 %    MCV 85.9 80.0 - 99.9 fL    MCH 28.6 26.0 - 35.0 pg    MCHC 33.3 32.0 - 34.5 %    RDW 13.2 11.5 - 15.0 fL    Platelets 866 915 - 988 E9/L    MPV 10.8 7.0 - 12.0 fL    Neutrophils % 56.7 43.0 - 80.0 %    Immature Granulocytes % 0.3 0.0 - 5.0 %    Lymphocytes % 29.3 20.0 - 42.0 %    Monocytes % 9.6 2.0 - 12.0 %    Eosinophils % 3.5 0.0 - 6.0 %    Basophils % 0.6 0.0 - 2.0 %    Neutrophils Absolute 4.50 1.80 - 7.30 E9/L    Immature Granulocytes # 0.02 E9/L    Lymphocytes Absolute 2.33 1.50 - 4.00 E9/L    Monocytes Absolute 0.76 0.10 - 0.95 E9/L    Eosinophils Absolute 0.28 0.05 - 0.50 E9/L    Basophils Absolute 0.05 0.00 - 0.20 E9/L   Troponin   Result Value Ref Range    Troponin, High Sensitivity 21 (H) 0 - 9 ng/L   Protime-INR   Result Value Ref Range    Protime 11.4 9.3 - 12.4 sec    INR 1.0    Brain Natriuretic Peptide   Result Value Ref Range    Pro- 0 - 450 pg/mL   Urinalysis   Result Value Ref Range    Color, UA Yellow Straw/Yellow    Clarity, UA Clear Clear    Glucose, Ur Negative Negative mg/dL    Bilirubin Urine Negative Negative    Ketones, Urine Negative Negative mg/dL    Specific Gravity, UA <=1.005 1.005 - 1.030    Blood, Urine TRACE-INTACT Negative    pH, UA 6.5 5.0 - 9.0    Protein, UA 30 (A) Negative mg/dL    Urobilinogen, Urine 0.2 <2.0 E.U./dL    Nitrite, Urine POSITIVE (A) Negative    Leukocyte Esterase, Urine SMALL (A) Negative   Microscopic Urinalysis   Result Value Ref Range    WBC, UA 2-5 0 - 5 /HPF    RBC, UA 2-5 0 - 2 /HPF    Epithelial Cells, UA FEW /HPF    Bacteria, UA MODERATE (A) None Seen /HPF       RADIOLOGY:  XR CHEST PORTABLE   Final Result   No acute process. EKG: This EKG is signed and interpreted by EP.     Rate: 125  Rhythm: Atrial fibrillation  Interpretation: atrial fibrillation (chronic) with RVR   Comparison: stable as compared to patient's most recent EKG   ------------------------- NURSING NOTES AND VITALS REVIEWED ---------------------------  Date / Time Roomed:  12/26/2022  8:57 PM  ED Bed Assignment:  [place] D/HD    The nursing notes within the ED encounter and vital signs as below have been reviewed. Patient Vitals for the past 24 hrs:   BP Temp Temp src Pulse Resp SpO2 Height Weight   12/26/22 2345 (!) 119/90 -- -- (!) 123 24 95 % -- --   12/26/22 2230 (!) 145/101 -- -- (!) 111 17 97 % -- --   12/26/22 2152 137/84 -- -- (!) 103 20 96 % -- --   12/26/22 2058 (!) 143/88 97.3 °F (36.3 °C) Oral (!) 134 -- -- 5' 1\" (1.549 m) 141 lb (64 kg)       Oxygen Saturation Interpretation: Normal    ------------------------------------------ PROGRESS NOTES ------------------------------------------  Re-evaluation(s):  Time: 3152  Patients symptoms are improving  Repeat physical examination is improved    Counseling:  I have spoken with the patient and discussed todays results, in addition to providing specific details for the plan of care and counseling regarding the diagnosis and prognosis. Their questions are answered at this time and they are agreeable with the plan of admission.    --------------------------------- ADDITIONAL PROVIDER NOTES ---------------------------------  Consultations:  Time: 26am. Spoke with Dr. Percy Womack Team.  Discussed case. They will admit the patient. This patient's ED course included: a personal history and physicial examination, re-evaluation prior to disposition, multiple bedside re-evaluations, IV medications, cardiac monitoring, and continuous pulse oximetry    This patient has remained hemodynamically stable during their ED course. Diagnosis:  1. Atrial fibrillation with rapid ventricular response ([address]    2.  Acute cystitis without hematuria        Disposition:  Patient's disposition: Admit to telemetry  Patient's condition is stable.            Socorro Starr DO  Resident  12/27/22 [id number]

## 2022-12-27 NOTE — PLAN OF CARE
[facility] cardiology consulted. Upon review of chart patient is known to Dr. An Medrano with [address] cardiology. Unit clerk spoke with in ER and cardiology consult changed to [facility] at this time.     Electronically signed by STACY Junior CNP on 12/27/2022 at 7:37 AM

## 2022-12-27 NOTE — ED NOTES
Dr. Danuta Houston notified of Pt's HR ranging from 125-145 bpm.      Haydee Ansari RN  12/27/22 [id number]

## 2022-12-27 NOTE — PROGRESS NOTES
Pharmacy Note    [address] [facility] was ordered fish oil capsules. As per the [address] [place] [address], herbals and certain dietary supplements will be discontinued.   The herbal or dietary supplement may be continued after discharge from the hospital.    Sybil ConradD, [facility], BCPS 12/27/2022 2:39 AM

## 2022-12-27 NOTE — CONSULTS
[facility] cardiology/the [address]    Name: Tony Gibson    Age: 80 y.o. Date of Admission: 12/26/2022  8:57 PM    Date of Service: 12/27/2022    Reason for Consultation: Came to the emergency room with palpitations, feeling jittery and a little anxious. Referring Physician:     History of Present Illness: Patient is evaluated and seen bedside in the emergency room with her  present. The patient is a 80y.o. year old female with a known history of likely persistent if not permanent atrial fibrillation at this point, chronic hypertension, palpitations historically, chronic Plavix 75 mg daily with prior significant fall risk and chronic anticoagulation stopped. CAD with stent to the mid LAD drug-eluting May 2018, 2 stents in 2004    Today in the emergency room she is still somewhat tachycardic heart rate in the 100-1 20 range laying in bed. She reports she does feel somewhat fatigued and tired with the tachycardia which seems to have occurred over the last couple of days prior to admission. No stroke symptoms or bleeding issues. She denies any clear-cut anginal or heart failure symptoms. She does report some dyspnea on exertion. Yesterday she was busy with her grandchildren and great-grandchildren active. Past Medical History: Chronic hypertension, paroxysmal atrial fibrillation becoming persistent or permanent over the last couple years, falls in the past and chronic anticoagulation discontinued for this reason. Chronic CAD with drug-eluting stent to the mid LAD May 2018 and also coronary stents in 2004.     Past surgical history: Hysterectomy, parathyroid surgery      Review of Systems:     Constitutional: No fever, chills, sweats  Cardiac: As per HPI  Pulmonary: No cough, wheeze, hemoptysis  HEENT: No visual disturbances or difficult swallowing  GI: No nausea, vomiting, diarrhea, abdominal pain, rectal bleeding  : No dysuria or hematuria  Endocrine: No excessive thirst, heat or cold intolerance. Musculoskeletal: No joint pain or muscle aches. No claudication  Skin: No skin breakdown or rashes  Neuro: No headache, confusion, or seizures  Psych: No depression, anxiety      Family History:  History reviewed. No pertinent family history.     Social History:  Social History     Socioeconomic History    Marital status:      Spouse name: Not on file    Number of children: Not on file    Years of education: Not on file    Highest education level: Not on file   Occupational History    Not on file   Tobacco Use    Smoking status: Never    Smokeless tobacco: Never   Vaping Use    Vaping Use: Never used   Substance and Sexual Activity    Alcohol use: No    Drug use: No    Sexual activity: Not on file   Other Topics Concern    Not on file   Social History Narrative    Not on file     Social Determinants of Health     Financial Resource Strain: Not on file   Food Insecurity: Not on file   Transportation Needs: Not on file   Physical Activity: Not on file   Stress: Not on file   Social Connections: Not on file   Intimate Partner Violence: Not on file   Housing Stability: Not on file       Allergies:  No Known Allergies    Current Medications:  Current Facility-Administered Medications   Medication Dose Route Frequency Provider Last Rate Last Admin    sodium chloride flush 0.9 % injection 5-40 mL  5-40 mL IntraVENous 2 times per day April STACY Teresa CNP        sodium chloride flush 0.9 % injection 5-40 mL  5-40 mL IntraVENous PRN April STACY Teresa CNP        0.9 % sodium chloride infusion   IntraVENous PRN April STACY Teresa CNP        enoxaparin (LOVENOX) injection 60 mg  1 mg/kg SubCUTAneous BID April STACY Teresa CNP   60 mg at 12/27/22 0744    polyethylene glycol (GLYCOLAX) packet 17 g  17 g Oral Daily PRN April STACY Teresa CNP        acetaminophen (TYLENOL) tablet 650 mg  650 mg (CRESTOR) tablet 20 mg  20 mg Oral Nightly April STACY Teresa CNP        cefTRIAXone (ROCEPHIN) 1,000 mg in sterile water 10 mL IV syringe  1,000 mg IntraVENous Q24H April STACY Teresa CNP        0.9 % sodium chloride infusion   IntraVENous Continuous April STACY Teresa CNP 50 mL/hr at 12/27/22 0606 Rate Verify at 12/27/22 0606    digoxin (LANOXIN) injection 250 mcg  250 mcg IntraVENous Once Inderjit Duke MD         Current Outpatient Medications   Medication Sig Dispense Refill    apixaban (ELIQUIS) 5 MG TABS tablet Take 1 tablet by mouth in the morning and 1 tablet before bedtime. (Patient not taking: Reported on 12/27/2022) 60 tablet 5    losartan (COZAAR) 50 MG tablet Take 1 tablet by mouth in the morning. (Patient taking differently: Take 100 mg by mouth daily) 30 tablet 3    metoprolol succinate (TOPROL XL) 25 MG extended release tablet Take 1 tablet by mouth in the morning and at bedtime 30 tablet 3    potassium chloride (KLOR-CON M) 20 MEQ extended release tablet Take 20 mEq by mouth in the morning. predniSONE (DELTASONE) 10 MG tablet Take 10 mg by mouth See directions for remaining medication:  7/19 - 10 mg BID  7/20 - 7/22 - 10 mg daily   7/23 - 7/25 - 5 mg daily (Patient not taking: Reported on 12/27/2022)      citalopram (CELEXA) 20 MG tablet Take 10 mg by mouth in the morning.       clopidogrel (PLAVIX) 75 MG tablet Take 75 mg by mouth daily       rosuvastatin (CRESTOR) 20 MG tablet Take 20 mg by mouth daily       pantoprazole (PROTONIX) 40 MG tablet Take 40 mg by mouth daily       acetaminophen (TYLENOL) 500 MG tablet Take 500 mg by mouth every 6 hours as needed for Pain      amLODIPine (NORVASC) 5 MG tablet Take 5 mg by mouth 2 times daily      calcitRIOL (ROCALTROL) 0.25 MCG capsule Take 0.25 mcg by mouth daily      isosorbide mononitrate (IMDUR) 30 MG extended release tablet Take 30 mg by mouth daily      LORazepam (ATIVAN) 0.5 MG tablet Take 0.25 mg by mouth 2 times daily. levothyroxine (SYNTHROID) 88 MCG tablet Take 88 mcg by mouth Daily      magnesium oxide (MAG-OX) 400 MG tablet Take 400 mg by mouth daily      Multiple Vitamins-Minerals (THERAPEUTIC MULTIVITAMIN-MINERALS) tablet Take 1 tablet by mouth daily      calcium carbonate 600 MG TABS tablet Take 1 tablet by mouth 3 times daily      Omega-3 Fatty Acids (FISH OIL) 435 MG CAPS Take 360 mg by mouth three times daily         sodium chloride      sodium chloride 50 mL/hr at 12/27/22 0606       Physical Exam:  /74   Pulse (!) 105   Temp 97.4 °F (36.3 °C) (Oral)   Resp 25   Ht 5' 1\" (1.549 m)   Wt 141 lb (64 kg)   SpO2 93%   BMI 26.64 kg/m²   Weight change: Wt Readings from Last 3 Encounters:   12/26/22 141 lb (64 kg)   07/30/22 140 lb (63.5 kg)   07/30/22 140 lb (63.5 kg)         General: Awake, alert, oriented x3, no acute distress  HEENT: Unremarkable  Neck: No JVD or bruits. Cardiac: Regular rate and rhythm, normal S1 and S2, no extra heart sounds, murmurs, heaves, thrills  Resp: Lungs clear without wheezing or crackles. No accessory muscle use or retraction  Abdomen: soft, nontender, nondistended, no gross organomegaly or mass  Skin: Warm and dry, no cyanosis. Musculoskeletal: normal tone and strength in the upper and lower extremities bilaterally  Neuro: Grossly unremarkable  Psych: Cooperative, and normal affect    Intake/Output:    Intake/Output Summary (Last 24 hours) at 12/27/2022 1414  Last data filed at 12/26/2022 2221  Gross per 24 hour   Intake 50 ml   Output --   Net 50 ml     No intake/output data recorded. Laboratory Tests:  Lab Results   Component Value Date    CREATININE 0.7 12/27/2022    BUN 17 12/27/2022     12/27/2022    K 3.6 12/27/2022     (H) 12/27/2022    CO2 20 (L) 12/27/2022     No results for input(s): CKTOTAL, CKMB in the last 72 hours.     Invalid input(s): TROPONONI  No results found for: BNP  Lab Results   Component Value Date/Time    WBC 7.5 12/27/2022 05:11 AM    RBC 4.57 12/27/2022 05:11 AM    HGB 13.3 12/27/2022 05:11 AM    HCT 39.3 12/27/2022 05:11 AM    MCV 86.0 12/27/2022 05:11 AM    MCH 29.1 12/27/2022 05:11 AM    MCHC 33.8 12/27/2022 05:11 AM    RDW 13.3 12/27/2022 05:11 AM     12/27/2022 05:11 AM    MPV 10.5 12/27/2022 05:11 AM     Recent Labs     12/26/22  2140   ALKPHOS 84   ALT 19   AST 24   PROT 7.7   BILITOT 0.3   LABALBU 4.8     Lab Results   Component Value Date/Time    MG 2.0 11/29/2021 04:34 AM     Lab Results   Component Value Date/Time    PROTIME 11.4 12/26/2022 09:40 PM    INR 1.0 12/26/2022 09:40 PM     Lab Results   Component Value Date/Time    TSH 0.469 07/19/2022 11:09 AM     No components found for: CHLPL  Lab Results   Component Value Date    TRIG 112 05/04/2018     Lab Results   Component Value Date    HDL 60 05/04/2018     Lab Results   Component Value Date    LDLCALC 76 05/04/2018     Lab Results   Component Value Date    INR 1.0 12/26/2022       Admission ECG December 26, 2022 at 2103 personally reviewed by me revealed atrial fibrillation heart rate in the 115 range, QRS 68     Personally reviewed her telemetry in the emergency room and shows atrial fibrillation heart rate in the 100-1 20 range. ASSESSMENT / PLAN:    1. Atrial fibrillation rapid ventricular rate at times. Will continue metoprolol succinate 25 mg twice a day, also IV digoxin this afternoon. Again would not fully anticoagulate her with Eliquis due to significant fall risk. #2 hypertension blood pressure reasonable on current medications. Suspect being somewhat anxious may cause a little more hypertension. #3 palpitations and some degree of anxiety and suspect recurrence of atrial fibrillation tachycardia causing some of the fatigue shortness of breath and distress. #4 chronic CAD prior percutaneous stents and modify CAD risk factors.   Continue on Plavix 75 mg daily, increase metoprolol succinate up to 25 mg twice a day and continue isosorbide mononitrate 30 mg daily, rosuvastatin 20 mg daily. Elevated troponin consistently in the 20s and 30s historically and this admission troponin 21 and then 26 then 21. EKG without ischemic change. #5 chronic anticoagulation and I have done this before and due to her age of 80 and increased fall risk and with falls and bleeding risk would not fully anticoagulate her. Try to control ventricular rate with additional IV digoxin and restart metoprolol. Could potentially be discharged home from the emergency room later this evening and be seen back [facility] cardiology in 2 to 4 weeks outpatient.     Electronically signed by Dana Kwok MD on 12/27/2022 at 2:14 PM

## 2022-12-27 NOTE — CARE COORDINATION
CM transition of care. Pt presented to [facility] ER secondary to palpitations. Admitted inpatient for A-fib. Cardiology on consult. Met with pt a\nd pt  at the bedside to discuss d/c planning. They live together in a single story home. PTA pt reports she is independent with ADLs and an active . Has a ww at home that she does not use. Has hx with [facility]. PCP is Dr Onel Quinteros and uses [facility] and uses [facility] on [address] for short term meds. Pt plans to d/c to home when medically stable. No home going needs identified. Pt  to provide transportation at d/c.  CM/SW to follow.   Divya Menendez RN CM

## 2022-12-27 NOTE — ED NOTES
Home medications reviewed with patient at bedside. Treatment team notified of home medications that need reordered.      Faiza Costa RN  12/27/22 1215

## 2022-12-27 NOTE — ED NOTES
Dr Irving Zhou called. Cardizem Push 15 mg over 2 minutes then Cardizem IV drip 15 mg/ hr. verbal read back done.       Tiara Sharma RN  12/27/22 4035

## 2022-12-28 PROCEDURE — 97535 SELF CARE MNGMENT TRAINING: CPT

## 2022-12-28 PROCEDURE — 97165 OT EVAL LOW COMPLEX 30 MIN: CPT

## 2022-12-28 PROCEDURE — 97161 PT EVAL LOW COMPLEX 20 MIN: CPT

## 2022-12-28 PROCEDURE — 97530 THERAPEUTIC ACTIVITIES: CPT

## 2022-12-28 PROCEDURE — 6360000002 HC RX W HCPCS: Performed by: NURSE PRACTITIONER

## 2022-12-28 PROCEDURE — 2140000000 HC CCU INTERMEDIATE R&B

## 2022-12-28 PROCEDURE — 2500000003 HC RX 250 WO HCPCS: Performed by: NURSE PRACTITIONER

## 2022-12-28 PROCEDURE — 6370000000 HC RX 637 (ALT 250 FOR IP): Performed by: NURSE PRACTITIONER

## 2022-12-28 PROCEDURE — 2500000003 HC RX 250 WO HCPCS: Performed by: INTERNAL MEDICINE

## 2022-12-28 PROCEDURE — 2580000003 HC RX 258: Performed by: NURSE PRACTITIONER

## 2022-12-28 PROCEDURE — 6370000000 HC RX 637 (ALT 250 FOR IP): Performed by: INTERNAL MEDICINE

## 2022-12-28 RX ORDER — DILTIAZEM HYDROCHLORIDE 180 MG/1
180 CAPSULE, COATED, EXTENDED RELEASE ORAL DAILY
Status: DISCONTINUED | OUTPATIENT
Start: 2022-12-28 | End: 2022-12-29 | Stop reason: HOSPADM

## 2022-12-28 RX ADMIN — AMLODIPINE BESYLATE 5 MG: 5 TABLET ORAL at 09:04

## 2022-12-28 RX ADMIN — CALCIUM CARBONATE 500 MG: 500 TABLET, CHEWABLE ORAL at 12:19

## 2022-12-28 RX ADMIN — CLOPIDOGREL BISULFATE 75 MG: 75 TABLET ORAL at 09:04

## 2022-12-28 RX ADMIN — ROSUVASTATIN 20 MG: 20 TABLET, FILM COATED ORAL at 20:20

## 2022-12-28 RX ADMIN — CALCIUM CARBONATE 500 MG: 500 TABLET, CHEWABLE ORAL at 20:20

## 2022-12-28 RX ADMIN — METOPROLOL SUCCINATE 25 MG: 25 TABLET, EXTENDED RELEASE ORAL at 09:08

## 2022-12-28 RX ADMIN — METOPROLOL SUCCINATE 25 MG: 25 TABLET, EXTENDED RELEASE ORAL at 20:20

## 2022-12-28 RX ADMIN — SODIUM CHLORIDE: 9 INJECTION, SOLUTION INTRAVENOUS at 20:44

## 2022-12-28 RX ADMIN — LOSARTAN POTASSIUM 50 MG: 50 TABLET, FILM COATED ORAL at 09:04

## 2022-12-28 RX ADMIN — LEVOTHYROXINE SODIUM 88 MCG: 0.09 TABLET ORAL at 06:25

## 2022-12-28 RX ADMIN — DILTIAZEM HYDROCHLORIDE 180 MG: 180 CAPSULE, COATED, EXTENDED RELEASE ORAL at 12:19

## 2022-12-28 RX ADMIN — DEXTROSE MONOHYDRATE 15 MG/HR: 50 INJECTION, SOLUTION INTRAVENOUS at 00:11

## 2022-12-28 RX ADMIN — Medication 10 ML: at 20:21

## 2022-12-28 RX ADMIN — METOPROLOL TARTRATE 2.5 MG: 5 INJECTION, SOLUTION INTRAVENOUS at 06:24

## 2022-12-28 RX ADMIN — ENOXAPARIN SODIUM 60 MG: 100 INJECTION SUBCUTANEOUS at 09:03

## 2022-12-28 RX ADMIN — MULTIPLE VITAMINS W/ MINERALS TAB 1 TABLET: TAB at 09:05

## 2022-12-28 RX ADMIN — METOPROLOL TARTRATE 2.5 MG: 5 INJECTION, SOLUTION INTRAVENOUS at 18:00

## 2022-12-28 RX ADMIN — ISOSORBIDE MONONITRATE 30 MG: 30 TABLET, EXTENDED RELEASE ORAL at 09:03

## 2022-12-28 RX ADMIN — POTASSIUM CHLORIDE 20 MEQ: 1500 TABLET, EXTENDED RELEASE ORAL at 09:04

## 2022-12-28 RX ADMIN — METOPROLOL TARTRATE 2.5 MG: 5 INJECTION, SOLUTION INTRAVENOUS at 12:19

## 2022-12-28 RX ADMIN — Medication 400 MG: at 09:04

## 2022-12-28 RX ADMIN — PANTOPRAZOLE SODIUM 40 MG: 40 TABLET, DELAYED RELEASE ORAL at 09:03

## 2022-12-28 RX ADMIN — CEFTRIAXONE 1000 MG: 1 INJECTION, POWDER, FOR SOLUTION INTRAMUSCULAR; INTRAVENOUS at 23:22

## 2022-12-28 RX ADMIN — CITALOPRAM HYDROBROMIDE 10 MG: 10 TABLET, FILM COATED ORAL at 09:05

## 2022-12-28 RX ADMIN — CALCITRIOL CAPSULES 0.25 MCG 0.25 MCG: 0.25 CAPSULE ORAL at 09:04

## 2022-12-28 RX ADMIN — METOPROLOL TARTRATE 2.5 MG: 5 INJECTION, SOLUTION INTRAVENOUS at 00:12

## 2022-12-28 RX ADMIN — ENOXAPARIN SODIUM 60 MG: 100 INJECTION SUBCUTANEOUS at 20:20

## 2022-12-28 NOTE — PROGRESS NOTES
[facility] [address]    Name: Reid Cao    Age: 80 y.o. PCP: Ricki De Jesus MD    Date of Admission: 12/26/2022  8:57 PM    Date of Service: 12/28/2022    Chief Complaint: Follow-up for atrial fibrillation, RVR  History of Present Illness: Patient is evaluated and seen bedside in the emergency room with her  present. The patient is a 80y.o. year old female with a known history of likely persistent if not permanent atrial fibrillation at this point, chronic hypertension, palpitations historically, chronic Plavix 75 mg daily with prior significant fall risk and chronic anticoagulation stopped. CAD with stent to the mid LAD drug-eluting May 2018, 2 stents in 2004     Today in the emergency room she is still somewhat tachycardic heart rate in the 100-1 20 range laying in bed. She reports she does feel somewhat fatigued and tired with the tachycardia which seems to have occurred over the last couple of days prior to admission. No stroke symptoms or bleeding issues. She denies any clear-cut anginal or heart failure symptoms. She does report some dyspnea on exertion. Yesterday she was busy with her grandchildren and great-grandchildren active. Interim History:  No new overnight cardiac complaints. Currently with no complaints of CP, SOB, palpitations, dizziness, or lightheadedness. Feels great. Still on IV cardizem. Daughter and  present. Daughter asked about [employer] down the road. Discussed. Telemetry personally reviewed and showed since arrival to the floor afib rates in the 80's overnight.       Review of Systems:   Cardiac: As per HPI  General: No fever, chills  Pulmonary: No cough, wheeze, or shortness of breath  GI: No nausea, vomiting,or abdominal pain  Neuro: No headache or confusion    Problem List:  Principal Problem:    Atrial fibrillation with RVR (HCC)  Active Problems:    UTI (urinary tract infection)  Resolved Problems:    * No resolved hospital problems. *      Past Medical History:  Past Medical History:   Diagnosis Date    CAD (coronary artery disease)     Cancer ([facility] [address]     skin cancer    Emphysema lung (HCC)     Hyperlipidemia     Hypertension     Thyroid disease        Allergies:   Allergies   Allergen Reactions    Prednisone Palpitations       Current Medications:  Current Facility-Administered Medications   Medication Dose Route Frequency Provider Last Rate Last Admin    sodium chloride flush 0.9 % injection 5-40 mL  5-40 mL IntraVENous 2 times per day April MYLENE TeresaN - CNP   10 mL at 12/27/22 2028    sodium chloride flush 0.9 % injection 5-40 mL  5-40 mL IntraVENous PRN April Brii APRN - CNP        0.9 % sodium chloride infusion   IntraVENous PRN April MYLENE TeresaN - CNP        enoxaparin (LOVENOX) injection 60 mg  1 mg/kg SubCUTAneous BID April Brii APRN - CNP   60 mg at 12/28/22 4589    polyethylene glycol (GLYCOLAX) packet 17 g  17 g Oral Daily PRN April Brii APRN - CNP        acetaminophen (TYLENOL) tablet 650 mg  650 mg Oral Q6H PRN April MYLENE TeresaN - CNP        Or    acetaminophen (TYLENOL) suppository 650 mg  650 mg Rectal Q6H PRN April Brii APRN - CNP        amLODIPine (NORVASC) tablet 5 mg  5 mg Oral Daily April JoannFarhad APRN - CNP   5 mg at 12/28/22 9729    calcitRIOL (ROCALTROL) capsule 0.25 mcg  0.25 mcg Oral Daily April Brii APRN - CNP   0.25 mcg at 12/28/22 4323    calcium carbonate (TUMS) chewable tablet 500 mg  500 mg Oral TID April Brii APRN - CNP   500 mg at 12/28/22 5905    citalopram (CELEXA) tablet 10 mg  10 mg Oral Daily April Brii APRN - CNP   10 mg at 12/28/22 5414    clopidogrel (PLAVIX) tablet 75 mg  75 mg Oral Daily April Brii APRN - CNP   75 mg at 12/28/22 0904    isosorbide mononitrate (IMDUR) extended release tablet 30 mg  30 mg Oral Daily April JoannYi APRN - CNP   30 mg at 12/28/22 5123    levothyroxine (SYNTHROID) tablet 88 mcg  88 mcg Oral Daily April SidneyCecilio APRN - CNP   88 mcg at 12/28/22 8092    losartan (COZAAR) tablet 50 mg  50 mg Oral Daily April SidneyCecilio APRN - CNP   50 mg at 12/28/22 8562    magnesium oxide (MAG-OX) tablet 400 mg  400 mg Oral Daily April Storey-Cecilio APRN - CNP   400 mg at 12/28/22 6324    metoprolol (LOPRESSOR) injection 2.5 mg  2.5 mg IntraVENous Q6H April SidneyCecilio APRN - CNP   2.5 mg at 12/28/22 1967    metoprolol succinate (TOPROL XL) extended release tablet 25 mg  25 mg Oral BID April SidneyCecilio APRN - CNP   25 mg at 12/28/22 2725    therapeutic multivitamin-minerals 1 tablet  1 tablet Oral Daily April JoannMYLENE RichmondN - CNP   1 tablet at 12/28/22 0905    pantoprazole (PROTONIX) tablet 40 mg  40 mg Oral Daily April SidneyCecilio APRN - CNP   40 mg at 12/28/22 7158    potassium chloride (KLOR-CON M) extended release tablet 20 mEq  20 mEq Oral Daily April ChiltonYi APRN - CNP   20 mEq at 12/28/22 8445    rosuvastatin (CRESTOR) tablet 20 mg  20 mg Oral Nightly April Brii APRN - CNP   20 mg at 12/27/22 2025    cefTRIAXone (ROCEPHIN) 1,000 mg in sterile water 10 mL IV syringe  1,000 mg IntraVENous Q24H April ChiltonYi APRN - CNP   1,000 mg at 12/27/22 2156    0.9 % sodium chloride infusion   IntraVENous Continuous April Brii APRN - CNP 50 mL/hr at 12/27/22 0606 Rate Verify at 12/27/22 0606    dilTIAZem 100 mg in dextrose 5 % 100 mL infusion (ADD-Bono)  2.5-15 mg/hr IntraVENous Continuous Sabra Perez MD   Stopped at 12/28/22 0438      sodium chloride      sodium chloride 50 mL/hr at 12/27/22 0606    dilTIAZem (CARDIZEM) 100 mg in dextrose 5% 100 mL (ADD-Bono) Stopped (12/28/22 0138)       Physical Exam:  /79   Pulse 78   Temp 97.5 °F (36.4 °C) (Temporal)   Resp 16   Ht 5' 1\" (1.549 m)   Wt 138 lb 12.5 oz (62.9 kg)   SpO2 95%   BMI 26.22 kg/m²   Weight change: -2 lb 3.2 oz (-0.998 kg)  Wt Readings from Last 3 Encounters:   12/28/22 138 lb 12.5 oz (62.9 kg)   07/30/22 140 lb (63.5 kg)   07/30/22 140 lb (63.5 kg)     General: Awake, alert, oriented x3, no acute distress  Neck: No JVD, carotid bruits, thyromegaly, or lymphadenopathy  Cardiac: irreg irreg, normal S1 and split S2, no murmurs, no S3 or S4, no pericardial rubs. Carotid upstrokes brisk  Resp: clear bilaterally without wheezes, rhonchi, or rales; unlabored respirations  Abdomen: soft, nontender, nondistended, BS+; no masses, bruits, or hepatomegaly  Extremities: no cyanosis, clubbing, or edema. Distal pulses intact  Skin: Warm and dry, no rashes or lesions  Neuro: moves all extremities to command, no focal deficits noted    Intake/Output:    Intake/Output Summary (Last 24 hours) at 12/28/2022 1119  Last data filed at 12/28/2022 0953  Gross per 24 hour   Intake 1423.69 ml   Output 300 ml   Net 1123.69 ml     I/O this shift:  In: 300 [P.O.:300]  Out: -     Laboratory Tests:  Last 3 CBC:  Recent Labs     12/26/22 2140 12/27/22  0511   WBC 7.9 7.5   RBC 4.75 4.57   HGB 13.6 13.3   HCT 40.8 39.3   MCV 85.9 86.0   MCH 28.6 29.1   MCHC 33.3 33.8   RDW 13.2 13.3    261   MPV 10.8 10.5       Last 3 CMP:    Recent Labs     12/26/22 2140 12/27/22  0511    141   K 3.6 3.6    108*   CO2 18* 20*   BUN 23 17   CREATININE 0.9 0.7   GLUCOSE 188* 124*   CALCIUM 9.5 8.5*   PROT 7.7  --    LABALBU 4.8  --    BILITOT 0.3  --    ALKPHOS 84  --    AST 24  --    ALT 19  --        Last 3 Mag/Phos:  No results for input(s): MG, PHOS in the last 72 hours. Last 3 CK, CKMB, Troponin  No results for input(s): CKTOTAL, CKMB, TROPONINI in the last 72 hours. Last 3 BNP:  No results for input(s): BNP in the last 72 hours.   No results found for: BNP    Last 3 Glucose:     Recent Labs     12/26/22  2140 12/27/22  0511   GLUCOSE 188* 124*       Last 3 Coags:  Recent Labs     12/26/22 2140   PROTIME 11.4   INR 1.0     Lab Results   Component Value Date/Time    PROTIME 11.4 12/26/2022 09:40 PM    INR 1.0 12/26/2022 09:40 PM       Last 3 Lipid Panel:  No results for input(s): LDLCALC, HDL, TRIG in the last 72 hours. Invalid input(s): CHLPL  Lab Results   Component Value Date    LDLCALC 76 05/04/2018     Lab Results   Component Value Date    HDL 60 05/04/2018     Lab Results   Component Value Date    TRIG 112 05/04/2018     No components found for: CHLPL    TSH:  No results for input(s): TSH in the last 72 hours. Lab Results   Component Value Date/Time    TSH 0.469 07/19/2022 11:09 AM           Radiology:  XR CHEST PORTABLE   Final Result   No acute process. ASSESSMENT / PLAN:     1. Atrial fibrillation rapid ventricular rate at times. Will continue metoprolol succinate 25 mg twice a day, start po cardizem in place of amlodipine. Wean off IV cardizem if heart rate < 110. Again would not fully anticoagulate her with Eliquis due to significant fall risk. #2 hypertension blood pressure reasonable on current medications. Suspect being somewhat anxious may cause a little more hypertension. #3 palpitations and some degree of anxiety and suspect recurrence of atrial fibrillation tachycardia causing some of the fatigue shortness of breath and distress. #4 chronic CAD prior percutaneous stents and modify CAD risk factors. Continue on Plavix 75 mg daily, increase metoprolol succinate up to 25 mg twice a day and continue isosorbide mononitrate 30 mg daily, rosuvastatin 20 mg daily. Elevated troponin consistently in the 20s and 30s historically and this admission troponin 21 and then 26 then 21. EKG without ischemic change. #5 chronic anticoagulation and I have done this before and due to her age of 80 and increased fall risk and with falls and bleeding risk would not fully anticoagulate her.              Winston De Jesus MD, [address] [place] [address] [facility] at [facility] signed by Destiny High MD on 12/28/2022 at 11:19 AM

## 2022-12-28 NOTE — PATIENT CARE CONFERENCE
P Quality Flow/Interdisciplinary Rounds Progress Note        Quality Flow Rounds held on December 28, 2022    Disciplines Attending:  Bedside Nurse, , , and Nursing Unit [address] was admitted on 12/26/2022  8:57 PM    Anticipated Discharge Date:       Disposition:    Nicko Score:  Nicko Scale Score: 20    Readmission Risk              Risk of Unplanned Readmission:  23           Discussed patient goal for the day, patient clinical progression, and barriers to discharge.   The following Goal(s) of the Day/Commitment(s) have been identified:  report labs/diagnostics      Elvin Be RN  December 28, 2022

## 2022-12-28 NOTE — ED NOTES
Provider made aware of patient vitals. Will carry out further orders.      David Isaacs RN  12/27/22 1546

## 2022-12-28 NOTE — PROGRESS NOTES
[address]        XFZN:                                                  Patient Name: Carie Stanford    MRN: [id number]    : 1934    Room: [address] [place] [address]          Evaluating OT: Maria Antonia Chan OTR/L; [id number]       Referring Provider: STACY Eubanks CNP    Specific Provider Orders/Date: OT Eval and Treat 22       Diagnosis: Atrial fibrillation with RVR;  UTI (urinary tract infection). Surgery: None this admission     Pertinent Medical History:  has a past medical history of CAD (coronary artery disease), Cancer ([facility] [address] Emphysema lung ([facility] [address] Hyperlipidemia, Hypertension, and Thyroid disease.      Recommended Adaptive Equipment: TBD     Precautions:  Fall Risk, monitor HR     Assessment of current deficits    [x] Functional mobility  [x]ADLs  [x] Strength               [x]Cognition    [x] Functional transfers   [x] IADLs         [x] Safety Awareness   [x]Endurance    [] Fine Coordination              [x] Balance      [] Vision/perception   []Sensation     []Gross Motor Coordination  [] ROM  [] Delirium                   [] Motor Control     OT PLAN OF CARE   OT POC based on physician orders, patient diagnosis and results of clinical assessment    Frequency/Duration 1-3 days/wk for 2 weeks PRN   Specific OT Treatment Interventions to include:   * Instruction/training on adapted ADL techniques and AE recommendations to increase functional independence within precautions       * Training on energy conservation strategies, correct breathing pattern and techniques to improve independence/tolerance for self-care routine  * Functional transfer/mobility training/DME recommendations for increased independence, safety, and fall prevention  * Patient/Family education to increase follow through with safety techniques and functional independence  * Recommendation of environmental modifications for increased safety with functional transfers/mobility and ADLs  * Therapeutic exercise to improve motor endurance, ROM, and functional strength for ADLs/functional transfers  * Therapeutic activities to facilitate/challenge dynamic balance, stand tolerance for increased safety and independence with ADLs  * Positioning to improve skin integrity, interaction with environment and functional independence    Home Living: Pt lives with  in 1 story home with 1 step & 1 handrail to enter. Laundry & sewing machine in basement with full flight of stairs & 2 handrails. Bathroom setup: Walk-in shower with grab bars   Equipment owned: ww    Prior Level of Function: IND with ADLs , IND with IADLs; engaged in functional mobility with use of  np AD  Driving: Yes  Occupation: None reported    Pain Level: Pt with no c/o pain throughout session  Cognition: A&O: 4/4; Follows 2 step directions   Memory:  Good   Sequencing:  Fair+   Problem solving:  Fair+   Judgement/safety:  Fair     Functional Assessment:  AM-PAC Daily Activity Raw Score: 18/24   Initial Eval Status  Date: 12/28/22 Treatment Status  Date: STGs = LTGs  Time frame: 10-14 days   Feeding Setup   Independent    Grooming Stand by Assist   Independent    UB Dressing Stand by Assist   Independent    LB Dressing Minimal Assist   Independent    Bathing Stand by Assist  Independent    Toileting Minimal Assist   Independent    Bed Mobility  Supine to sit: Stand by Assist   Sit to supine: NT   Supine to sit: Independent   Sit to supine: Independent    Functional Transfers Sit to stand:SBA   Stand to sit:SBA  Stand pivot: SBA  Commode: SBA  Sit to stand:Independent    Stand to sit: Independent   Stand pivot: Independent   Commode: Independent     Functional Mobility SBA  No use of AD to<>from bathroom  Independent    Balance Sitting:     Static - Supervision     Dynamic - Supervision  Standing: SBA  Sitting:     Static: Independent      Dynamic: Independent   Standing: Independent    Activity Tolerance Fair  Good   Visual/  Perceptual Glasses: Yes -readers  Appears WFL        Safety Fair  Good  during ADL completion     Hand Dominance Right   AROM (PROM) Strength Additional Info:    RUE  WFL 4/5 grossly tested good  and wfl FMC/dexterity noted during ADL tasks     LUE WFL 4/5 grossly tested Good  and wfl FMC/dexterity noted during ADL tasks       Hearing: WFL  Sensation:  No c/o numbness or tingling BUE  Tone: WFL BUE  Edema: Unremarkable    Comment: Cleared by RN to see pt. Upon arrival patient supine in bed and agreeable to OT session with  & granddaughter in room. At end of session, patient seated in bedside chair with call light and phone within reach, all lines and tubes intact. Overall patient demonstrated decreased independence and safety during completion of ADL/functional transfer/mobility tasks. Therapist facilitated ADL tasks, functional transfers, functional mobility, bed mobility to address safety awareness, implementation of fall prevention strategies, & engagement throughout functional tasks. Pt would benefit from continued skilled OT to increase safety and independence with completion of ADL/IADL tasks for functional independence and quality of life. Treatment: OT treatment provided this date includes: ADL-  Instruction/training on safety and adapted techniques for completion of ADLs: Pt sat EOB to engage in LB dressing tasks. Pt required light assist to don pants over hips once standing. Pt simulated toileting task seated on commode requiring min verbal cues to maintain safe commode transfer. Pt educated on EC/WS strategies & activity modifications/adaptations to promote functional engagement, activity tolerance, & safety throughout ADLs. Mobility-  Instruction/training on safety and improved independence with bed mobility/functional transfers and functional mobility.  Pt utilized no AD to<>from bathroom demo'ing slow suzette during functional mobility. Pt required min verbal cues to maintain safe transfer progressions throughout session. Sitting EOB ~6 minutes to improve dynamic sitting balance and activity tolerance during ADLs. Activity tolerance- Instruction/training on energy conservation/work simplification for completion of ADLs: Pt mildly SOB with light activity. Skilled positioning/alignment-  Proper Positioning/Alignment. Pt required assist/cues to maintain proper body mechanics throughout session to promote EC/WS strategies, safety awareness, & implementation of fall prevention strategies throughout daily activities. Skilled monitoring of O2 sats/vitals-   HR mid 80s - low 140s throughout session. Pt wth no c/o dizziness. RN made aware. Rehab Potential: Good for established goals     LTG: maximize independence with ADLs to return to PLOF    Patient and/or family were instructed on functional diagnosis, prognosis/goals and OT plan of care. Demonstrated fair understanding. Eval Complexity: Low  History: Expanded chart review of medical records and additional review of physical, cognitive, or psychosocial history related to current functional performance  Exam: 3+ performance deficits  Assistance/Modification: Min/mod assistance or modifications required to perform tasks. May have comorbidities that affect occupational performance. Evaluation time includes thorough review of current medical information, gathering information on past medical & social history & PLOF, completion of standardized testing, informal observation of tasks, consultation with other medical professions/disciplines, assessment of data & development of POC/goals.      Time In: 12:20p  Time Out: 12:43p  Total Treatment Time: 8 minutes    Min Units   OT Eval Low 16012  x     OT Eval Medium 17803      OT Eval High 80082      OT Re-Eval [id number]       Therapeutic Ex 79181       Therapeutic Activities 94588 ADL/Self Care 93565  8 1    Orthotic Management 96107       Manual 34635     Neuro Re-Ed 38814       Non-Billable Time          Evaluation Time additionally includes thorough review of current medical information, gathering information on past medical history/social history and prior level of function, interpretation of standardized testing/informal observation of tasks, assessment of data and development of plan of care and goals.               Britni Verduzco OTR/L; [id number]

## 2022-12-28 NOTE — CARE COORDINATION
Reviewed chart, PT 18/24 300ft and 4 steps OT 18/24. Plan is home at discharge, spouses to transport home. No needs/concerns identified. Anticipate discharge in the next 24hrs. Jayleen He, MSW, LSW

## 2022-12-28 NOTE — PLAN OF CARE
Problem: Discharge Planning  Goal: Discharge to home or other facility with appropriate resources  Outcome: Progressing  Flowsheets  Taken 12/27/2022 2357  Discharge to home or other facility with appropriate resources: Identify barriers to discharge with patient and caregiver  Taken 12/27/2022 2337  Discharge to home or other facility with appropriate resources: Identify barriers to discharge with patient and caregiver     Problem: Safety - Adult  Goal: Free from fall injury  Outcome: Progressing     Problem: ABCDS Injury Assessment  Goal: Absence of physical injury  Outcome: Progressing

## 2022-12-28 NOTE — PROGRESS NOTES
Physical Therapy  Physical Therapy Initial Assessment     Name: Dania Castleman  : 1934  MRN: [id number]      Date of Service: 2022    Evaluating PT:  Rufus Caro PT, DPT [id number]    Room #:  5783/3574-B  Diagnosis:  A-fib [facility]) [I48.91]  Atrial fibrillation with rapid ventricular response (HCC) [I48.91]  Acute cystitis without hematuria [N30.00]  PMHx/PSHx:    Past Medical History:   Diagnosis Date    CAD (coronary artery disease)     Cancer ([facility] [address]     skin cancer    Emphysema lung ([facility] [address]     Hyperlipidemia     Hypertension     Thyroid disease      Procedure/Surgery:  None  Precautions:  Falls, HR monitoring   Equipment Needs:  TBD    SUBJECTIVE:    Pt lives with  in a 1 story home with 1 step to enter and grab bar. Full flight of steps and 1 rail to basement where sewing equipment is located. Pt ambulated without device and was independent PTA. Pt also owns a [employer].    OBJECTIVE:   Initial Evaluation  Date: 22 Treatment Short Term/ Long Term   Goals   AM-PAC 6 Clicks 67/56     Was pt agreeable to Eval/treatment? Yes     Does pt have pain? No c/o pain     Bed Mobility  Rolling: NT  Supine to sit: SBA  Sit to supine: SBA  Scooting: SBA  Mod Independent   Transfers Sit to stand: Nico improving to SBA  Stand to sit: SBA  Stand pivot: Nico no device  Mod Independent with AAD if needed   Ambulation   100 feet, 200 feet with Nico no device  >400 feet with Mod Independent with AAD if needed    Stair negotiation: ascended and descended 4 steps with 1 rail SBA  >10 steps with 1 rail Mod Independent   ROM BUE:  WFL  BLE:  WFL     Strength BUE:  WFL  BLE:  4/5  Increase by 1/3 MMT grade   Balance Sitting EOB:  SBA  Dynamic Standing:  Nico no device  Sitting EOB:  Independent  Dynamic Standing:   Mod Independent with AAD if needed     Pt is A & O x 4  Sensation:  no reported paresthesias  Edema:  none    Therapeutic Exercises:  NA    Patient education  Pt educated on safety, energy conservation, possible need for AAD    Patient response to education:   Pt verbalized understanding Pt demonstrated skill Pt requires further education in this area   x x x     ASSESSMENT:    Conditions Requiring Skilled Therapeutic Intervention:    [x]Decreased strength     []Decreased ROM  [x]Decreased functional mobility  [x]Decreased balance   [x]Decreased endurance   []Decreased posture  []Decreased sensation  []Decreased coordination   []Decreased vision  []Decreased safety awareness   []Increased pain       Comments:  Pt was in bed upon arrival, agreeable to initial evaluation. HR monitored throughout session and ranged from  bpm.  Pt ambulated with decreased gait speed and arm swing. Occasional minor LOBs that required assistance to correct. Pt reached for hallway railing and environment at times. One standing rest break required. Reciprocal pattern used for stair negotiation. Upon return to room, pt was assisted into bathroom and completed hygiene care without physical assistance. Pt was left in bed, per request, with all needs met and call light in reach. Mild SOB observed with activity and pt reported fatigue. Educated pt on energy conservation, including possible use of [employer]. Treatment:  Patient practiced and was instructed in the following treatment:    Bed mobility training - pt given verbal cues to facilitate proper sequencing and safety during supine>sit. Sitting EOB for >3 minutes for upright tolerance, postural awareness and BLE ROM  Transfer training - pt was given verbal and tactile cues to facilitate proper hand placement, technique and safety during sit to stand, stand to sit and stand pivot transfers as well as provided with physical assistance. Gait training- pt was given verbal and tactile cues to facilitate safety and balance during ambulation as well as provided with physical assistance. Pt's/ family goals   1.  Return home    Prognosis is good for reaching above PT goals. Patient and or family understand(s) diagnosis, prognosis, and plan of care. yes    PHYSICAL THERAPY PLAN OF CARE:    PT POC is established based on physician order and patient diagnosis     Referring provider/PT Order:  STACY Eubanks - CNP /12/27/22 0215 PT eval and treat  Diagnosis:  A-fib ([facility] [address] [I48.91]  Atrial fibrillation with rapid ventricular response (HCC) [I48.91]  Acute cystitis without hematuria [N30.00]  Specific instructions for next treatment:  Progress activity    Current Treatment Recommendations:     [x] Strengthening to improve independence with functional mobility   [] ROM to improve independence with functional mobility   [x] Balance Training to improve static/dynamic balance and to reduce fall risk  [x] Endurance Training to improve activity tolerance during functional mobility   [x] Transfer Training to improve safety and independence with all functional transfers   [x] Gait Training to improve gait mechanics, endurance and asses need for appropriate assistive device  [x] Stair Training in preparation for safe discharge home and/or into the community   [] Positioning to prevent skin breakdown and contractures  [x] Safety and Education Training   [x] Patient/Caregiver Education   [] HEP  [] Other     PT long term treatment goals are located in above grid    Frequency of treatments: 2-5x/week x 1-2 weeks. Time in  0740  Time out  0804    Total Treatment Time  10 minutes     Evaluation Time includes thorough review of current medical information, gathering information on past medical history/social history and prior level of function, completion of standardized testing/informal observation of tasks, assessment of data and education on plan of care and goals.     CPT codes:  [x] Low Complexity PT evaluation 31744  [] Moderate Complexity PT evaluation 79381  [] High Complexity PT evaluation 73850  [] PT Re-evaluation 56843  [] Gait training 40040 - minutes  [] Manual therapy 31167 - minutes  [x] Therapeutic activities 29817 10 minutes  [] Therapeutic exercises 35814 - minutes  [] Neuromuscular reeducation 27965 - minutes     Michell Sparrow PT, DPT  [id number]

## 2022-12-28 NOTE — PROGRESS NOTES
[facility]                                Progress note    Subjective: The patient is awake and alert. Lying in bed without any complaints  No acute events overnight. Denies chest pain, angina, SOB   Multiple family members at bedside case discussed. Objective:    /77   Pulse 73   Temp 97.3 °F (36.3 °C) (Oral)   Resp 18   Ht 5' 1\" (1.549 m)   Wt 138 lb 12.5 oz (62.9 kg)   SpO2 97%   BMI 26.22 kg/m²     In: 1423.7 [P.O.:300; I.V.:118]  Out: 300   In: 1423.7   Out: 300 [Urine:300]    General appearance: NAD, conversant  HEENT: AT/NC, MMM  Neck: FROM, supple  Lungs: Clear to auscultation  CV: irregular, no MRGs  Vasc: Radial pulses 2+  Abdomen: Soft, non-tender; no masses or HSM  Extremities: No peripheral edema or digital cyanosis, slight bilateral lower ext. Edema  Skin: no rash, lesions or ulcers  Psych: Alert and oriented to person, place and time  Neuro: Alert and interactive     Recent Labs     12/26/22  2140 12/27/22  0511   WBC 7.9 7.5   HGB 13.6 13.3   HCT 40.8 39.3    261       Recent Labs     12/26/22 2140 12/27/22  0511    141   K 3.6 3.6    108*   CO2 18* 20*   BUN 23 17   CREATININE 0.9 0.7   CALCIUM 9.5 8.5*       Assessment:    Principal Problem:    Atrial fibrillation with RVR ([facility])  Active Problems:    UTI (urinary tract infection)  Resolved Problems:    * No resolved hospital problems.  *      Plan:  Patient is an 27-year-old female admitted to [facility] for  Atrial fibrillation with RVR   -Monitor labs  -Currently on Toprol-XL 25 mg twice daily-currently rate is adequately controlled, continue to monitor  -Weight-based Lovenox  -Was placed on Eliquis in July of this year for new onset A. fib per ER note states recently taken off of the Eliquis, -would resume oral anticoagulation at 2.5 p.o. twice daily given her advanced age-  -Cardiology following  -Monitor mag and potassium  -+UA  -IV Rocephin pending urine culture, await pan culture results     Hypertension  -Continue home medications  -Continue to monitor Bps        CAD with history of stenting  -Currently on BB, statin, Plavix    12/28/2022  Medications adjustments-discontinue IV Cardizem/Cardizem  mg p.o. Discontinue amlodipine  Patient to be monitored overnight with likely discharge in the a.m. Educated patient and family members the patient needs to keep a blood pressure diary for the next 2 weeks and take to PCP for any medication adjustments. Patient is not a candidate for anticoagulation risks outweigh the benefits.   Urine culture-E. coli greater than 100,000  Continue Rocephin - will change to PO on discharge    Code status: Full  Consultants: Cardiology    DVT Prophylaxis   PT/OT  Discharge planning     STACY Phillip - CNP  2:31 PM  12/28/2022

## 2022-12-28 NOTE — PLAN OF CARE
Problem: Discharge Planning  Goal: Discharge to home or other facility with appropriate resources  Outcome: Progressing     Problem: Safety - Adult  Goal: Free from fall injury  Outcome: Progressing  Flowsheets (Taken 12/28/2022 1235)  Free From Fall Injury: Instruct family/caregiver on patient safety     Problem: ABCDS Injury Assessment  Goal: Absence of physical injury  Outcome: Progressing

## 2022-12-29 VITALS
TEMPERATURE: 97.7 F | OXYGEN SATURATION: 96 % | HEIGHT: 61 IN | RESPIRATION RATE: 18 BRPM | DIASTOLIC BLOOD PRESSURE: 66 MMHG | WEIGHT: 138.6 LBS | BODY MASS INDEX: 26.17 KG/M2 | HEART RATE: 76 BPM | SYSTOLIC BLOOD PRESSURE: 129 MMHG

## 2022-12-29 LAB
ORGANISM: ABNORMAL
URINE CULTURE, ROUTINE: ABNORMAL

## 2022-12-29 PROCEDURE — 2500000003 HC RX 250 WO HCPCS: Performed by: INTERNAL MEDICINE

## 2022-12-29 PROCEDURE — 2500000003 HC RX 250 WO HCPCS: Performed by: NURSE PRACTITIONER

## 2022-12-29 PROCEDURE — 6370000000 HC RX 637 (ALT 250 FOR IP): Performed by: NURSE PRACTITIONER

## 2022-12-29 PROCEDURE — 6370000000 HC RX 637 (ALT 250 FOR IP): Performed by: INTERNAL MEDICINE

## 2022-12-29 PROCEDURE — 6360000002 HC RX W HCPCS: Performed by: NURSE PRACTITIONER

## 2022-12-29 RX ORDER — ONDANSETRON 2 MG/ML
4 INJECTION INTRAMUSCULAR; INTRAVENOUS ONCE
Status: COMPLETED | OUTPATIENT
Start: 2022-12-29 | End: 2022-12-29

## 2022-12-29 RX ORDER — LOSARTAN POTASSIUM 50 MG/1
50 TABLET ORAL DAILY
Qty: 30 TABLET | Refills: 3 | Status: SHIPPED | OUTPATIENT
Start: 2022-12-29

## 2022-12-29 RX ORDER — DILTIAZEM HYDROCHLORIDE 180 MG/1
180 CAPSULE, COATED, EXTENDED RELEASE ORAL DAILY
Qty: 30 CAPSULE | Refills: 3 | Status: SHIPPED | OUTPATIENT
Start: 2022-12-30

## 2022-12-29 RX ORDER — METOPROLOL SUCCINATE 25 MG/1
25 TABLET, EXTENDED RELEASE ORAL 2 TIMES DAILY
Qty: 30 TABLET | Refills: 3 | Status: SHIPPED | OUTPATIENT
Start: 2022-12-29

## 2022-12-29 RX ADMIN — CALCITRIOL CAPSULES 0.25 MCG 0.25 MCG: 0.25 CAPSULE ORAL at 08:58

## 2022-12-29 RX ADMIN — ONDANSETRON 4 MG: 2 INJECTION INTRAMUSCULAR; INTRAVENOUS at 05:28

## 2022-12-29 RX ADMIN — METOPROLOL SUCCINATE 25 MG: 25 TABLET, EXTENDED RELEASE ORAL at 08:58

## 2022-12-29 RX ADMIN — DEXTROSE MONOHYDRATE 5 MG/HR: 50 INJECTION, SOLUTION INTRAVENOUS at 05:25

## 2022-12-29 RX ADMIN — PANTOPRAZOLE SODIUM 40 MG: 40 TABLET, DELAYED RELEASE ORAL at 08:59

## 2022-12-29 RX ADMIN — POTASSIUM CHLORIDE 20 MEQ: 1500 TABLET, EXTENDED RELEASE ORAL at 08:58

## 2022-12-29 RX ADMIN — METOPROLOL TARTRATE 2.5 MG: 5 INJECTION, SOLUTION INTRAVENOUS at 06:43

## 2022-12-29 RX ADMIN — METOPROLOL TARTRATE 2.5 MG: 5 INJECTION, SOLUTION INTRAVENOUS at 00:40

## 2022-12-29 RX ADMIN — DILTIAZEM HYDROCHLORIDE 180 MG: 180 CAPSULE, COATED, EXTENDED RELEASE ORAL at 08:59

## 2022-12-29 RX ADMIN — ENOXAPARIN SODIUM 60 MG: 100 INJECTION SUBCUTANEOUS at 08:59

## 2022-12-29 RX ADMIN — CITALOPRAM HYDROBROMIDE 10 MG: 10 TABLET, FILM COATED ORAL at 08:58

## 2022-12-29 RX ADMIN — LOSARTAN POTASSIUM 50 MG: 50 TABLET, FILM COATED ORAL at 08:58

## 2022-12-29 RX ADMIN — CALCIUM CARBONATE 500 MG: 500 TABLET, CHEWABLE ORAL at 13:29

## 2022-12-29 RX ADMIN — DEXTROSE MONOHYDRATE 2.5 MG/HR: 50 INJECTION, SOLUTION INTRAVENOUS at 04:51

## 2022-12-29 RX ADMIN — Medication 400 MG: at 08:58

## 2022-12-29 RX ADMIN — ISOSORBIDE MONONITRATE 30 MG: 30 TABLET, EXTENDED RELEASE ORAL at 08:58

## 2022-12-29 RX ADMIN — LEVOTHYROXINE SODIUM 88 MCG: 0.09 TABLET ORAL at 06:44

## 2022-12-29 RX ADMIN — CALCIUM CARBONATE 500 MG: 500 TABLET, CHEWABLE ORAL at 08:58

## 2022-12-29 RX ADMIN — CLOPIDOGREL BISULFATE 75 MG: 75 TABLET ORAL at 08:59

## 2022-12-29 RX ADMIN — MULTIPLE VITAMINS W/ MINERALS TAB 1 TABLET: TAB at 08:58

## 2022-12-29 RX ADMIN — ACETAMINOPHEN 650 MG: 325 TABLET ORAL at 04:58

## 2022-12-29 ASSESSMENT — PAIN SCALES - GENERAL
PAINLEVEL_OUTOF10: 0
PAINLEVEL_OUTOF10: 3

## 2022-12-29 NOTE — PROGRESS NOTES
[facility] [address]    Name: Gordo Mcintosh    Age: 80 y.o. PCP: Dionicio Hairston MD    Date of Admission: 12/26/2022  8:57 PM    Date of Service: 12/29/2022    Chief Complaint: Follow-up for atrial fibrillation, RVR  History of Present Illness: Patient is evaluated and seen bedside in the emergency room with her  present. The patient is a 80y.o. year old female with a known history of likely persistent if not permanent atrial fibrillation at this point, chronic hypertension, palpitations historically, chronic Plavix 75 mg daily with prior significant fall risk and chronic anticoagulation stopped. CAD with stent to the mid LAD drug-eluting May 2018, 2 stents in 2004     Today in the emergency room she is still somewhat tachycardic heart rate in the 100-1 20 range laying in bed. She reports she does feel somewhat fatigued and tired with the tachycardia which seems to have occurred over the last couple of days prior to admission. No stroke symptoms or bleeding issues. She denies any clear-cut anginal or heart failure symptoms. She does report some dyspnea on exertion. Yesterday she was busy with her grandchildren and great-grandchildren active. Interim History 12/28:  No new overnight cardiac complaints. Currently with no complaints of CP, SOB, palpitations, dizziness, or lightheadedness. Feels great. Still on IV cardizem. Daughter and  present. Daughter asked about Watchman down the road. Discussed. Telemetry personally reviewed and showed since arrival to the floor afib rates in the 80's overnight. Interim History 12/29:  States had a bad night due to the noise of the patient next door. Slept poorly and not much appetite. No chest pain, no SOB. Feels a bit weak. IV cardizem off.   Tele rates overall pretty well controlled had a brief episode of tachycardia overnight- (due to getting upset with the neighbor)    Review of Systems:   Cardiac: As per HPI  General: No fever, chills  Pulmonary: No cough, wheeze, or shortness of breath  GI: No nausea, vomiting,or abdominal pain  Neuro: No headache or confusion    Problem List:  Principal Problem:    Atrial fibrillation with RVR (HCC)  Active Problems:    UTI (urinary tract infection)  Resolved Problems:    * No resolved hospital problems. *      Past Medical History:  Past Medical History:   Diagnosis Date    CAD (coronary artery disease)     Cancer ([address]     skin cancer    Emphysema lung (HCC)     Hyperlipidemia     Hypertension     Thyroid disease        Allergies:   Allergies   Allergen Reactions    Prednisone Palpitations       Current Medications:  Current Facility-Administered Medications   Medication Dose Route Frequency Provider Last Rate Last Admin    dilTIAZem (CARDIZEM CD) extended release capsule 180 mg  180 mg Oral Daily Jamar Martinez MD   180 mg at 12/29/22 0859    sodium chloride flush 0.9 % injection 5-40 mL  5-40 mL IntraVENous 2 times per day April MYLENE TeresaN - CNP   10 mL at 12/28/22 2021    sodium chloride flush 0.9 % injection 5-40 mL  5-40 mL IntraVENous PRN April MYLENE TeresaN - CNP        0.9 % sodium chloride infusion   IntraVENous PRN April Brii APRN - CNP        enoxaparin (LOVENOX) injection 60 mg  1 mg/kg SubCUTAneous BID April Brii APRN - CNP   60 mg at 12/29/22 0859    polyethylene glycol (GLYCOLAX) packet 17 g  17 g Oral Daily PRN April Brii APRN - CNP        acetaminophen (TYLENOL) tablet 650 mg  650 mg Oral Q6H PRN April Brii APRN - CNP   650 mg at 12/29/22 1163    Or    acetaminophen (TYLENOL) suppository 650 mg  650 mg Rectal Q6H PRN April Brii APRN - CNP        calcitRIOL (ROCALTROL) capsule 0.25 mcg  0.25 mcg Oral Daily April Brii APRN - CNP   0.25 mcg at 12/29/22 0858    calcium carbonate (TUMS) chewable tablet 500 mg  500 mg Oral TID April Storey-Cecilio, APRN - CNP   500 mg at 12/29/22 0858    citalopram (CELEXA) tablet 10 mg  10 mg Oral Daily April Storey-MYLENE HernandesN - CNP   10 mg at 12/29/22 0858    clopidogrel (PLAVIX) tablet 75 mg  75 mg Oral Daily April Storey-Cecilio APRN - CNP   75 mg at 12/29/22 0859    isosorbide mononitrate (IMDUR) extended release tablet 30 mg  30 mg Oral Daily April Storey-Cecilio APRN - CNP   30 mg at 12/29/22 0858    levothyroxine (SYNTHROID) tablet 88 mcg  88 mcg Oral Daily April StoreyYi APRN - CNP   88 mcg at 12/29/22 0644    losartan (COZAAR) tablet 50 mg  50 mg Oral Daily April Storey-Cecilio, APRN - CNP   50 mg at 12/29/22 0858    magnesium oxide (MAG-OX) tablet 400 mg  400 mg Oral Daily April Storey-MYLENE HernandesN - CNP   400 mg at 12/29/22 0858    metoprolol (LOPRESSOR) injection 2.5 mg  2.5 mg IntraVENous Q6H April Storey-Cecilio APRN - CNP   2.5 mg at 12/29/22 0344    metoprolol succinate (TOPROL XL) extended release tablet 25 mg  25 mg Oral BID April Storey-Cecilio APRN - CNP   25 mg at 12/29/22 2692    therapeutic multivitamin-minerals 1 tablet  1 tablet Oral Daily April JoannMYLENE LlamasN - CNP   1 tablet at 12/29/22 0858    pantoprazole (PROTONIX) tablet 40 mg  40 mg Oral Daily April HockingFarhad, APRN - CNP   40 mg at 12/29/22 0859    potassium chloride (KLOR-CON M) extended release tablet 20 mEq  20 mEq Oral Daily April MYLENE TeresaN - CNP   20 mEq at 12/29/22 0858    rosuvastatin (CRESTOR) tablet 20 mg  20 mg Oral Nightly April Brii APRN - CNP   20 mg at 12/28/22 2020    cefTRIAXone (ROCEPHIN) 1,000 mg in sterile water 10 mL IV syringe  1,000 mg IntraVENous Q24H April STACY Teresa CNP   1,000 mg at 12/28/22 2322    0.9 % sodium chloride infusion   IntraVENous Continuous April STACY Teresa CNP 50 mL/hr at 12/28/22 2044 New Bag at 12/28/22 2044    dilTIAZem 100 mg in dextrose 5 % 100 mL infusion ([place])  2.5-15 mg/hr IntraVENous Continuous Nathaly Hill MD   Stopped at 12/29/22 0857      sodium chloride      sodium chloride 50 mL/hr at 12/28/22 2044    dilTIAZem (CARDIZEM) 100 mg in dextrose 5% 100 mL (ADD[place]) Stopped (12/29/22 0857)       Physical Exam:  /67   Pulse 68   Temp 97.4 °F (36.3 °C) (Oral)   Resp 20   Ht 5' 1\" (1.549 m)   Wt 138 lb 9.6 oz (62.9 kg)   SpO2 98%   BMI 26.19 kg/m²   Weight change: -3.2 oz (-0.091 kg)  Wt Readings from Last 3 Encounters:   12/29/22 138 lb 9.6 oz (62.9 kg)   07/30/22 140 lb (63.5 kg)   07/30/22 140 lb (63.5 kg)     General: Awake, alert, oriented x3, no acute distress  Neck: No JVD, carotid bruits, thyromegaly, or lymphadenopathy  Cardiac: irreg irreg, normal S1 and split S2, no murmurs, no S3 or S4, no pericardial rubs. Carotid upstrokes brisk  Resp: clear bilaterally without wheezes, rhonchi, or rales; unlabored respirations  Abdomen: soft, nontender, nondistended, BS+; no masses, bruits, or hepatomegaly  Extremities: no cyanosis, clubbing, or edema. Distal pulses intact  Skin: Warm and dry, no rashes or lesions  Neuro: moves all extremities to command, no focal deficits noted    Intake/Output:    Intake/Output Summary (Last 24 hours) at 12/29/2022 1047  Last data filed at 12/28/2022 2100  Gross per 24 hour   Intake 360 ml   Output 200 ml   Net 160 ml     No intake/output data recorded.     Laboratory Tests:  Last 3 CBC:  Recent Labs     12/26/22 2140 12/27/22  0511   WBC 7.9 7.5   RBC 4.75 4.57   HGB 13.6 13.3   HCT 40.8 39.3   MCV 85.9 86.0   MCH 28.6 29.1   MCHC 33.3 33.8   RDW 13.2 13.3    261   MPV 10.8 10.5       Last 3 CMP:    Recent Labs     12/26/22 2140 12/27/22  0511    141   K 3.6 3.6    108*   CO2 18* 20*   BUN 23 17   CREATININE 0.9 0.7   GLUCOSE 188* 124*   CALCIUM 9.5 8.5*   PROT 7.7  --    LABALBU 4.8  --    BILITOT 0.3  --    ALKPHOS 84  --    AST 24  --    ALT 19  --        Last 3 Mag/Phos:  No results for input(s): MG, PHOS in the last 72 hours. Last 3 CK, CKMB, Troponin  No results for input(s): CKTOTAL, CKMB, TROPONINI in the last 72 hours. Last 3 BNP:  No results for input(s): BNP in the last 72 hours. No results found for: BNP    Last 3 Glucose:     Recent Labs     12/26/22 2140 12/27/22  0511   GLUCOSE 188* 124*       Last 3 Coags:  Recent Labs     12/26/22 2140   PROTIME 11.4   INR 1.0     Lab Results   Component Value Date/Time    PROTIME 11.4 12/26/2022 09:40 PM    INR 1.0 12/26/2022 09:40 PM       Last 3 Lipid Panel:  No results for input(s): LDLCALC, HDL, TRIG in the last 72 hours. Invalid input(s): CHLPL  Lab Results   Component Value Date    LDLCALC 76 05/04/2018     Lab Results   Component Value Date    HDL 60 05/04/2018     Lab Results   Component Value Date    TRIG 112 05/04/2018     No components found for: CHLPL    TSH:  No results for input(s): TSH in the last 72 hours. Lab Results   Component Value Date/Time    TSH 0.469 07/19/2022 11:09 AM           Radiology:  XR CHEST PORTABLE   Final Result   No acute process. ASSESSMENT / PLAN:     1. Atrial fibrillation rapid ventricular rate at times. Will continue metoprolol succinate 25 mg twice a day,  cardizem in place of amlodipine. Heart rate appears adequately controlled. Again would not fully anticoagulate her with Eliquis due to significant fall risk. She is stable from a cardiac standpoint to go home when you wish. F/u [facility] in January. #2 hypertension blood pressure reasonable on current medications. Suspect being somewhat anxious may cause a little more hypertension. #3 palpitations and some degree of anxiety and suspect recurrence of atrial fibrillation tachycardia causing some of the fatigue shortness of breath and distress. #4 chronic CAD prior percutaneous stents and modify CAD risk factors.   Continue on Plavix 75 mg daily, increase metoprolol succinate up to 25 mg twice a day and continue isosorbide mononitrate 30 mg daily, rosuvastatin 20 mg daily. Elevated troponin consistently in the 20s and 30s historically and this admission troponin 21 and then 26 then 21. EKG without ischemic change. #5 chronic anticoagulation and I have done this before and due to her age of 80 and increased fall risk and with falls and bleeding risk would not fully anticoagulate her.              Adriane Espino MD, [address] at [facility]    Electronically signed by Adriane Espino MD on 12/29/2022 at 10:47 AM

## 2022-12-29 NOTE — PATIENT CARE CONFERENCE
P Quality Flow/Interdisciplinary Rounds Progress Note        Quality Flow Rounds held on December 29, 2022    Disciplines Attending:  Bedside Nurse, , , and Nursing Unit [address] was admitted on 12/26/2022  8:57 PM    Anticipated Discharge Date:  Expected Discharge Date: 12/29/22    Disposition:    Nicko Score:  Nicko Scale Score: 20    Readmission Risk              Risk of Unplanned Readmission:  23           Discussed patient goal for the day, patient clinical progression, and barriers to discharge.   The following Goal(s) of the Day/Commitment(s) have been identified:  discharge Fatuma Wagner 83, RN  December 29, 2022

## 2022-12-29 NOTE — PROGRESS NOTES
Discharge instructions, appointments and medications reviewed with patient. Patient verbalized understanding and discharged home via private car with daughter.

## 2022-12-29 NOTE — PLAN OF CARE
Problem: Discharge Planning  Goal: Discharge to home or other facility with appropriate resources  12/29/2022 0604 by Slim Sesay RN  Outcome: Progressing     Problem: Safety - Adult  Goal: Free from fall injury  12/29/2022 0834 by Ibrahima Carranza RN  Outcome: Progressing  12/29/2022 0604 by Slim Sesay RN  Outcome: Progressing     Problem: ABCDS Injury Assessment  Goal: Absence of physical injury  12/29/2022 0834 by Ibrahima Carranza RN  Outcome: Progressing  12/29/2022 0604 by Slim Sesay RN  Outcome: Progressing

## 2022-12-29 NOTE — PROGRESS NOTES
[facility]                                Progress note    Subjective: The patient is awake and alert. Lying in bed without any complaints  No acute events overnight. Denies chest pain, angina, SOB   Multiple family members at bedside case discussed. Objective:    /66   Pulse 76   Temp 97.7 °F (36.5 °C) (Oral)   Resp 18   Ht 5' 1\" (1.549 m)   Wt 138 lb 9.6 oz (62.9 kg)   SpO2 96%   BMI 26.19 kg/m²     In: 660 [P.O.:660]  Out: 200   In: 660   Out: 200 [Urine:200]    General appearance: NAD, conversant  HEENT: AT/NC, MMM  Neck: FROM, supple  Lungs: Clear to auscultation  CV: irregular, no MRGs  Vasc: Radial pulses 2+  Abdomen: Soft, non-tender; no masses or HSM  Extremities: No peripheral edema or digital cyanosis, slight bilateral lower ext. Edema  Skin: no rash, lesions or ulcers  Psych: Alert and oriented to person, place and time  Neuro: Alert and interactive     Recent Labs     12/26/22  2140 12/27/22  0511   WBC 7.9 7.5   HGB 13.6 13.3   HCT 40.8 39.3    261       Recent Labs     12/26/22 2140 12/27/22  0511    141   K 3.6 3.6    108*   CO2 18* 20*   BUN 23 17   CREATININE 0.9 0.7   CALCIUM 9.5 8.5*       Assessment:    Principal Problem:    Atrial fibrillation with RVR ([facility])  Active Problems:    UTI (urinary tract infection)  Resolved Problems:    * No resolved hospital problems.  *      Plan:  Patient is an 80-year-old female admitted to [facility] for  Atrial fibrillation with RVR   -Monitor labs  -Currently on Toprol-XL 25 mg twice daily-currently rate is adequately controlled, continue to monitor  -Weight-based Lovenox  -Was placed on Eliquis in July of this year for new onset A. fib per ER note states recently taken off of the Eliquis, -would resume oral anticoagulation at 2.5 p.o. twice daily given her advanced age-  -Cardiology following  -Monitor mag and potassium  -+UA  -IV Rocephin pending urine culture, await pan culture results Hypertension  -Continue home medications  -Continue to monitor Bps        CAD with history of stenting  -Currently on BB, statin, Plavix    12/28/2022  Medications adjustments-discontinue IV Cardizem/Cardizem  mg p.o. Discontinue amlodipine  Patient to be monitored overnight with likely discharge in the a.m. Educated patient and family members the patient needs to keep a blood pressure diary for the next 2 weeks and take to PCP for any medication adjustments. Patient is not a candidate for anticoagulation risks outweigh the benefits. Urine culture-E. coli greater than 100,000  Continue Rocephin - will change to PO on discharge    12/29/2022  Patient heart rate was elevated through the night  Medications adjusted by cardiology.   Will monitor through the night  Vital signs and labs stable    Code status: Full  Consultants: Cardiology    DVT Prophylaxis   PT/OT  Discharge planning     STACY Mcgovern CNP  1:07 PM  12/29/2022

## 2022-12-29 NOTE — PLAN OF CARE
Problem: Discharge Planning  Goal: Discharge to home or other facility with appropriate resources  12/29/2022 1444 by Cory Sharma RN  Outcome: Completed  12/29/2022 0604 by Raman Johnson RN  Outcome: Progressing     Problem: Safety - Adult  Goal: Free from fall injury  12/29/2022 1444 by Cory Sharma RN  Outcome: Completed  12/29/2022 0834 by Cory Sharma RN  Outcome: Progressing  12/29/2022 0604 by Raman Johnson RN  Outcome: Progressing     Problem: ABCDS Injury Assessment  Goal: Absence of physical injury  12/29/2022 1444 by Cory Sharma RN  Outcome: Completed  12/29/2022 0834 by Cory Sharma RN  Outcome: Progressing  12/29/2022 0604 by Raman Johnson RN  Outcome: Progressing     Problem: Pain  Goal: Verbalizes/displays adequate comfort level or baseline comfort level  Outcome: Completed

## 2022-12-29 NOTE — PROGRESS NOTES
CLINICAL PHARMACY NOTE: MEDS TO BEDS    Total # of Prescriptions Filled: 2   The following medications were delivered to the patient:  Diltiazem 180mg  Metoprolol 25mg    Additional Documentation:

## 2023-01-26 PROBLEM — E86.0 DEHYDRATION: Status: RESOLVED | Noted: 2022-12-27 | Resolved: 2023-01-26

## 2023-01-26 PROBLEM — N39.0 UTI (URINARY TRACT INFECTION): Status: RESOLVED | Noted: 2022-12-27 | Resolved: 2023-01-26

## 2023-09-18 NOTE — PROGRESS NOTES
Gab Alfonso is a 35 year old male here for:    Abdominal issues     he currently reports:    Losing his job ---> he is stressed     When he has stress, he gets abdominal pain.    He is getting more constipated.    He is bloating a lot.      Left sided pain.      He has NOT lost weight but he is trying to be healthier     He is not liking living with his dad and his uncle.   They are not active at all.  He will probably move out when he gets a new job.          Past Medical History:   Diagnosis Date   • Allergic rhinitis    • Asthma    • Eczema    • High triglycerides    • Keratosis pilaris    • Seborrheic dermatitis    • Social phobia        Current Outpatient Medications   Medication Sig Dispense Refill   • ALPRAZolam (XANAX) 0.25 MG tablet Take 1 tablet by mouth 3 times daily as needed for Anxiety. 30 tablet 0   • Multiple Vitamin (MULTIVITAMIN) tablet Take 1 tablet by mouth daily.        No current facility-administered medications for this visit.         Social History     Tobacco Use   • Smoking status: Never   • Smokeless tobacco: Never   Vaping Use   • Vaping Use: never used   Substance Use Topics   • Alcohol use: Yes     Comment: occ   • Drug use: Never       ROS:  none significant except for that stated above     Physical Exam:  Visit Vitals  /64   Pulse 64   Wt 92.5 kg (204 lb)   BMI 26.19 kg/m²           General:  Pleasant, in no acute distress, sitting on exam table  Abdomen:  Soft, slightly TTP In LUQ, Non-distended, bowel sounds appreciated  Extremities:  No lower extremity edema  Lymphatics:  No cervical lymphadenopathy      Gab was seen today for gas.    Diagnoses and all orders for this visit:    LUQ pain  Likely   Irritable bowel syndrome with constipation  Low FODMAP diet  miralax +/- citrucel for constipation    Advise therapy       Follow up: As needed or with worsening symptoms       Physical Therapy  Physical Therapy Initial Assessment       Name: Nikia Lozada  : 1934  MRN: [id number]      Date of Service: 2022    Evaluating PT:  Margie Rosen PT, DPT  [id number]    Room #:  4009/4627-C  Diagnosis:  Atrial fibrillation with RVR ([facility] [I48.91]  PMHx/PSHx:   has a past medical history of CAD (coronary artery disease), Cancer ([facility] Emphysema lung ([facility] Hyperlipidemia, Hypertension, and Thyroid disease. Procedure/Surgery:    Precautions:  Falls  Equipment Needs:  Owns [employer]    SUBJECTIVE:    Pt lives with spouse in a 1 story home with 1 stairs to enter with grab bar. Bed is on first floor and bath is on first floor. Pt ambulated with no device independently PTA. Equipment Owned:     OBJECTIVE:   Initial Evaluation  Date: 22 Treatment Short Term/ Long Term   Goals   AM-PAC 6 Clicks      Was pt agreeable to Eval/treatment? Yes     Does pt have pain? No c/o pain     Bed Mobility  Rolling: NT  Supine to sit: NT  Sit to supine: NT  Scooting: SBA  Rolling: Independent  Supine to sit: Independent  Sit to supine: Independent  Scooting: Independent     Transfers Sit to stand: SBA  Stand to sit: SBA  Stand pivot: Nico no AD  Sit to stand: Modified Independent    Stand to sit: Modified Independent    Stand pivot: Modified Independent     Ambulation    100 feet with Nico WW  >150 feet with Modified Independent      Stair negotiation: ascended and descended  NT  >4 steps with single rail Modified Independent     ROM BUE:  Defer to OT  BLE:  WFL     Strength BUE:  Defer to OT  BLE:  4/5  Improve 1 MMT   Balance Sitting EOB:  SBA  Dynamic Standing:  Nico [employer]  Sitting EOB:  Independent    Dynamic Standing:  Modified Independent       Pt is A & O x 4  Sensation:  WNL  Edema:   WNL    Vitals:    HR 70  Spo2 97%  PRE    Spo2 97%   ACTIVITY    HR 75  Spo2 96%  POST      Therapeutic Exercises:  functional mobility    Patient education  Pt educated on role of PT    Patient response to education:   Pt verbalized understanding Pt demonstrated skill Pt requires further education in this area   x x x     ASSESSMENT:    Conditions Requiring Skilled Therapeutic Intervention:    [x]Decreased strength     [x]Decreased ROM  [x]Decreased functional mobility  [x]Decreased balance   [x]Decreased endurance   []Decreased posture  []Decreased sensation  []Decreased coordination   []Decreased vision  [x]Decreased safety awareness   [x]Increased pain       Comments:  Pt agreeable to PT evaluation. Pt performing transfers without assist. Pt with LOB requiring assist to prevent fall initially. [employer]aled [employer] and patient demonstrates improved stability. Pt gait pattern slow with cues for [employer] approximation. Patient would benefit from continued skilled PT to maximize functional mobility independence. Treatment:  Patient practiced and was instructed in the following treatment:    Bed mobility- verbal cues to facilitate independence  Functional transfers-Verbal cues for proper positioning and sequencing to perform transfers safely with maximum independence. Gait training-Verbal cues for proper positioning and sequencing using assistive device to maximize functional mobility independence. Pt's/ family goals   1. Get better    Prognosis is good for reaching above PT goals. Patient and or family understand(s) diagnosis, prognosis, and plan of care.   yes    PHYSICAL THERAPY PLAN OF CARE:    PT POC is established based on physician order and patient diagnosis     Referring provider/PT Order:    Standing Count:  1 Occurrences,   R         Silvana Armendariz, APRN - CNP     Diagnosis:  Atrial fibrillation with RVR ([address] [I48.91]  Specific instructions for next treatment:  Gait training to improve strength    Current Treatment Recommendations:     [x] Strengthening to improve independence with functional mobility   [x] ROM to improve independence with functional mobility   [x] Balance Training to improve static/dynamic balance and to reduce fall risk  [x] Endurance Training to improve activity tolerance during functional mobility   [x] Transfer Training to improve safety and independence with all functional transfers   [x] Gait Training to improve gait mechanics, endurance and assess need for appropriate assistive device  [x] Stair Training in preparation for safe discharge home and/or into the community   [x] Positioning to prevent skin breakdown and contractures  [x] Safety and Education Training   [x] Patient/Caregiver Education   [x] HEP  [] Other     PT long term treatment goals are located in above grid    Frequency of treatments: 2-5x/week x 1-2 weeks. Time in  1419  Time out  1439    Total Treatment Time  8 minutes     Evaluation Time includes thorough review of current medical information, gathering information on past medical history/social history and prior level of function, completion of standardized testing/informal observation of tasks, assessment of data and education on plan of care and goals.     CPT codes:  [x] Low Complexity PT evaluation 06557  [] Moderate Complexity PT evaluation 94658  [] High Complexity PT evaluation 28923  [] PT Re-evaluation 53015  [] Gait training 41309 0 minutes  [] Manual therapy 93080 0 minutes  [x] Therapeutic activities 84903 8 minutes  [] Therapeutic exercises 55993 0 minutes  [] Neuromuscular reeducation 47417 0 minutes       Lizbet Lowe PT, DPT   [id number]

## 2024-04-13 ENCOUNTER — HOSPITAL ENCOUNTER (EMERGENCY)
Age: 89
Discharge: HOME | End: 2024-04-13
Attending: EMERGENCY MEDICINE
Payer: MEDICARE

## 2024-04-13 ENCOUNTER — APPOINTMENT (OUTPATIENT)
Dept: CT IMAGING | Age: 89
End: 2024-04-13
Payer: MEDICARE

## 2024-04-13 VITALS
HEART RATE: 60 BPM | TEMPERATURE: 97.9 F | BODY MASS INDEX: 24.55 KG/M2 | OXYGEN SATURATION: 94 % | SYSTOLIC BLOOD PRESSURE: 169 MMHG | DIASTOLIC BLOOD PRESSURE: 78 MMHG | WEIGHT: 130 LBS | HEIGHT: 61 IN | RESPIRATION RATE: 17 BRPM

## 2024-04-13 DIAGNOSIS — M62.838 SPASM OF MUSCLE: Primary | ICD-10-CM

## 2024-04-13 DIAGNOSIS — K59.00 CONSTIPATION, UNSPECIFIED CONSTIPATION TYPE: ICD-10-CM

## 2024-04-13 LAB
ALBUMIN SERPL-MCNC: 4.4 G/DL (ref 3.5–5.2)
ALP SERPL-CCNC: 67 U/L (ref 35–104)
ALT SERPL-CCNC: 15 U/L (ref 0–32)
ANION GAP SERPL CALCULATED.3IONS-SCNC: 12 MMOL/L (ref 7–16)
AST SERPL-CCNC: 20 U/L (ref 0–31)
BASOPHILS # BLD: 0.04 K/UL (ref 0–0.2)
BASOPHILS NFR BLD: 1 % (ref 0–2)
BILIRUB SERPL-MCNC: 0.3 MG/DL (ref 0–1.2)
BILIRUB UR QL STRIP: NEGATIVE
BUN SERPL-MCNC: 33 MG/DL (ref 6–23)
CALCIUM SERPL-MCNC: 9.4 MG/DL (ref 8.6–10.2)
CHLORIDE SERPL-SCNC: 106 MMOL/L (ref 98–107)
CLARITY UR: CLEAR
CO2 SERPL-SCNC: 22 MMOL/L (ref 22–29)
COLOR UR: YELLOW
CREAT SERPL-MCNC: 1.1 MG/DL (ref 0.5–1)
EOSINOPHIL # BLD: 0.28 K/UL (ref 0.05–0.5)
EOSINOPHILS RELATIVE PERCENT: 4 % (ref 0–6)
ERYTHROCYTE [DISTWIDTH] IN BLOOD BY AUTOMATED COUNT: 13.2 % (ref 11.5–15)
GFR SERPL CREATININE-BSD FRML MDRD: 51 ML/MIN/1.73M2
GLUCOSE SERPL-MCNC: 119 MG/DL (ref 74–99)
GLUCOSE UR STRIP-MCNC: NEGATIVE MG/DL
HCT VFR BLD AUTO: 36.4 % (ref 34–48)
HGB BLD-MCNC: 12.1 G/DL (ref 11.5–15.5)
HGB UR QL STRIP.AUTO: NEGATIVE
IMM GRANULOCYTES # BLD AUTO: <0.03 K/UL (ref 0–0.58)
IMM GRANULOCYTES NFR BLD: 0 % (ref 0–5)
KETONES UR STRIP-MCNC: NEGATIVE MG/DL
LACTATE BLDV-SCNC: 1.3 MMOL/L (ref 0.5–2.2)
LEUKOCYTE ESTERASE UR QL STRIP: ABNORMAL
LIPASE SERPL-CCNC: 36 U/L (ref 13–60)
LYMPHOCYTES NFR BLD: 2.39 K/UL (ref 1.5–4)
LYMPHOCYTES RELATIVE PERCENT: 37 % (ref 20–42)
MCH RBC QN AUTO: 28.1 PG (ref 26–35)
MCHC RBC AUTO-ENTMCNC: 33.2 G/DL (ref 32–34.5)
MCV RBC AUTO: 84.7 FL (ref 80–99.9)
MONOCYTES NFR BLD: 0.65 K/UL (ref 0.1–0.95)
MONOCYTES NFR BLD: 10 % (ref 2–12)
NEUTROPHILS NFR BLD: 48 % (ref 43–80)
NEUTS SEG NFR BLD: 3.12 K/UL (ref 1.8–7.3)
NITRITE UR QL STRIP: NEGATIVE
PH UR STRIP: 6 [PH] (ref 5–9)
PLATELET # BLD AUTO: 232 K/UL (ref 130–450)
PMV BLD AUTO: 10.3 FL (ref 7–12)
POTASSIUM SERPL-SCNC: 4.3 MMOL/L (ref 3.5–5)
PROT SERPL-MCNC: 7.2 G/DL (ref 6.4–8.3)
PROT UR STRIP-MCNC: NEGATIVE MG/DL
RBC # BLD AUTO: 4.3 M/UL (ref 3.5–5.5)
RBC #/AREA URNS HPF: ABNORMAL /HPF
SODIUM SERPL-SCNC: 140 MMOL/L (ref 132–146)
SP GR UR STRIP: 1.01 (ref 1–1.03)
UROBILINOGEN UR STRIP-ACNC: 0.2 EU/DL (ref 0–1)
WBC #/AREA URNS HPF: ABNORMAL /HPF
WBC OTHER # BLD: 6.5 K/UL (ref 4.5–11.5)

## 2024-04-13 PROCEDURE — 80053 COMPREHEN METABOLIC PANEL: CPT

## 2024-04-13 PROCEDURE — 96374 THER/PROPH/DIAG INJ IV PUSH: CPT

## 2024-04-13 PROCEDURE — 51798 US URINE CAPACITY MEASURE: CPT

## 2024-04-13 PROCEDURE — 96375 TX/PRO/DX INJ NEW DRUG ADDON: CPT

## 2024-04-13 PROCEDURE — 6360000002 HC RX W HCPCS

## 2024-04-13 PROCEDURE — 74177 CT ABD & PELVIS W/CONTRAST: CPT

## 2024-04-13 PROCEDURE — 2580000003 HC RX 258

## 2024-04-13 PROCEDURE — 81001 URINALYSIS AUTO W/SCOPE: CPT

## 2024-04-13 PROCEDURE — 6370000000 HC RX 637 (ALT 250 FOR IP)

## 2024-04-13 PROCEDURE — 99285 EMERGENCY DEPT VISIT HI MDM: CPT

## 2024-04-13 PROCEDURE — 83605 ASSAY OF LACTIC ACID: CPT

## 2024-04-13 PROCEDURE — 6360000004 HC RX CONTRAST MEDICATION: Performed by: RADIOLOGY

## 2024-04-13 PROCEDURE — 85025 COMPLETE CBC W/AUTO DIFF WBC: CPT

## 2024-04-13 PROCEDURE — 83690 ASSAY OF LIPASE: CPT

## 2024-04-13 RX ORDER — LIDOCAINE 4 G/G
1 PATCH TOPICAL ONCE
Status: DISCONTINUED | OUTPATIENT
Start: 2024-04-13 | End: 2024-04-14 | Stop reason: HOSPADM

## 2024-04-13 RX ORDER — LIDOCAINE 50 MG/G
1 PATCH TOPICAL DAILY
Qty: 10 PATCH | Refills: 0 | Status: SHIPPED | OUTPATIENT
Start: 2024-04-13 | End: 2024-04-23

## 2024-04-13 RX ORDER — LIDOCAINE 4 G/G
1 PATCH TOPICAL DAILY
Status: DISCONTINUED | OUTPATIENT
Start: 2024-04-14 | End: 2024-04-13

## 2024-04-13 RX ORDER — 0.9 % SODIUM CHLORIDE 0.9 %
500 INTRAVENOUS SOLUTION INTRAVENOUS ONCE
Status: COMPLETED | OUTPATIENT
Start: 2024-04-13 | End: 2024-04-13

## 2024-04-13 RX ORDER — KETOROLAC TROMETHAMINE 15 MG/ML
15 INJECTION, SOLUTION INTRAMUSCULAR; INTRAVENOUS ONCE
Status: COMPLETED | OUTPATIENT
Start: 2024-04-13 | End: 2024-04-13

## 2024-04-13 RX ORDER — FENTANYL CITRATE 50 UG/ML
50 INJECTION, SOLUTION INTRAMUSCULAR; INTRAVENOUS ONCE
Status: COMPLETED | OUTPATIENT
Start: 2024-04-13 | End: 2024-04-13

## 2024-04-13 RX ADMIN — FENTANYL CITRATE 50 MCG: 50 INJECTION, SOLUTION INTRAMUSCULAR; INTRAVENOUS at 18:57

## 2024-04-13 RX ADMIN — IOPAMIDOL 75 ML: 755 INJECTION, SOLUTION INTRAVENOUS at 19:26

## 2024-04-13 RX ADMIN — KETOROLAC TROMETHAMINE 15 MG: 15 INJECTION, SOLUTION INTRAMUSCULAR; INTRAVENOUS at 22:06

## 2024-04-13 RX ADMIN — SODIUM CHLORIDE 500 ML: 9 INJECTION, SOLUTION INTRAVENOUS at 18:25

## 2024-04-13 ASSESSMENT — LIFESTYLE VARIABLES
HOW MANY STANDARD DRINKS CONTAINING ALCOHOL DO YOU HAVE ON A TYPICAL DAY: PATIENT DOES NOT DRINK
HOW OFTEN DO YOU HAVE A DRINK CONTAINING ALCOHOL: NEVER

## 2024-04-13 ASSESSMENT — PAIN DESCRIPTION - LOCATION
LOCATION: FLANK
LOCATION: BACK;FLANK
LOCATION: FLANK

## 2024-04-13 ASSESSMENT — PAIN DESCRIPTION - DESCRIPTORS
DESCRIPTORS: DISCOMFORT
DESCRIPTORS: SHARP;DISCOMFORT
DESCRIPTORS: SPASM

## 2024-04-13 ASSESSMENT — PAIN DESCRIPTION - PAIN TYPE: TYPE: ACUTE PAIN

## 2024-04-13 ASSESSMENT — PAIN DESCRIPTION - ORIENTATION
ORIENTATION: LEFT
ORIENTATION: LEFT

## 2024-04-13 ASSESSMENT — PAIN SCALES - GENERAL
PAINLEVEL_OUTOF10: 10
PAINLEVEL_OUTOF10: 5

## 2024-04-13 ASSESSMENT — PAIN - FUNCTIONAL ASSESSMENT: PAIN_FUNCTIONAL_ASSESSMENT: 0-10

## 2024-04-14 NOTE — ED NOTES
Bladder scan completed. Patient has use the restroom within the last hour. Bladder scan shows 24 cc urine in patients bladder. Physician notified.

## 2024-04-14 NOTE — ED PROVIDER NOTES
[facility] EMERGENCY DEPARTMENT  EMERGENCY DEPARTMENT ENCOUNTER        Pt Name: Vesna Mark  MRN: [id number]  Birthdate 12/8/1934  Date of evaluation: 4/13/2024  Provider: Maria C Hoang MD  PCP: Adair Black MD  Note Started: 10:48 AM EDT 4/14/24    CHIEF COMPLAINT       Chief Complaint   Patient presents with    Flank Pain     Left sided flank pain, describes as 'spasms', denies any urinary symptoms        HISTORY OF PRESENT ILLNESS: 1 or more Elements     Vesna Mark is a 89 y.o. female past medical history of CAD, hypertension, hyperlipidemia, thyroid disease, emphysema who presents with left-sided flank pain.  Patient states that the pain is intermittent and worse with movement and spasms down her lower back.  States that she has had back pain before and spasms that feels similar to this however this time she has not been able to find any relief at home.  Patient denies any urinary or bowel incontinence, denies any saddle anesthesia.  Patient is ambulatory with no issues with ambulation.  Patient denies any history of kidney stones.  She denies any falls or injuries.  Denies any wounds or bruises.  Patient is not anticoagulated. Denies any fever, chills, n/v, headache, dizziness, vision changes, neck tenderness or stiffness, weakness, cp, palpitations, leg swelling/tenderness, sob, cough, abd pain, dysuria, hematuria, diarrhea, constipation, bloody stools.    Nursing Notes were all reviewed and agreed with or any disagreements were addressed in the HPI.    ROS:   Pertinent positives and negatives are stated within HPI, all other systems reviewed and are negative.      --------------------------------------------- PAST HISTORY ---------------------------------------------  Past Medical History:  has a past medical history of CAD (coronary artery disease), Cancer (HCC), Emphysema lung (HCC), Hyperlipidemia, Hypertension, and Thyroid disease.    Past

## 2024-09-03 ENCOUNTER — HOSPITAL ENCOUNTER (EMERGENCY)
Age: 89
Discharge: HOME | End: 2024-09-04
Attending: EMERGENCY MEDICINE
Payer: MEDICARE

## 2024-09-03 ENCOUNTER — APPOINTMENT (OUTPATIENT)
Dept: CT IMAGING | Age: 89
End: 2024-09-03
Payer: MEDICARE

## 2024-09-03 VITALS
HEART RATE: 65 BPM | SYSTOLIC BLOOD PRESSURE: 194 MMHG | HEIGHT: 61 IN | BODY MASS INDEX: 24.55 KG/M2 | TEMPERATURE: 98 F | OXYGEN SATURATION: 96 % | WEIGHT: 130 LBS | RESPIRATION RATE: 18 BRPM | DIASTOLIC BLOOD PRESSURE: 108 MMHG

## 2024-09-03 DIAGNOSIS — R33.9 URINARY RETENTION: Primary | ICD-10-CM

## 2024-09-03 DIAGNOSIS — K59.00 CONSTIPATION, UNSPECIFIED CONSTIPATION TYPE: ICD-10-CM

## 2024-09-03 DIAGNOSIS — R91.1 PULMONARY NODULE, RIGHT: ICD-10-CM

## 2024-09-03 DIAGNOSIS — R10.9 LEFT FLANK PAIN: ICD-10-CM

## 2024-09-03 LAB
ALBUMIN SERPL-MCNC: 4.9 G/DL (ref 3.5–5.2)
ALP SERPL-CCNC: 79 U/L (ref 35–104)
ALT SERPL-CCNC: 24 U/L (ref 0–32)
ANION GAP SERPL CALCULATED.3IONS-SCNC: 15 MMOL/L (ref 7–16)
AST SERPL-CCNC: 27 U/L (ref 0–31)
BASOPHILS # BLD: 0.07 K/UL (ref 0–0.2)
BASOPHILS NFR BLD: 1 % (ref 0–2)
BILIRUB SERPL-MCNC: 0.5 MG/DL (ref 0–1.2)
BUN SERPL-MCNC: 24 MG/DL (ref 6–23)
CALCIUM SERPL-MCNC: 9.7 MG/DL (ref 8.6–10.2)
CHLORIDE SERPL-SCNC: 105 MMOL/L (ref 98–107)
CO2 SERPL-SCNC: 21 MMOL/L (ref 22–29)
CREAT SERPL-MCNC: 1 MG/DL (ref 0.5–1)
EOSINOPHIL # BLD: 0.27 K/UL (ref 0.05–0.5)
EOSINOPHILS RELATIVE PERCENT: 4 % (ref 0–6)
ERYTHROCYTE [DISTWIDTH] IN BLOOD BY AUTOMATED COUNT: 13.3 % (ref 11.5–15)
GFR, ESTIMATED: 57 ML/MIN/1.73M2
GLUCOSE SERPL-MCNC: 122 MG/DL (ref 74–99)
HCT VFR BLD AUTO: 39.7 % (ref 34–48)
HGB BLD-MCNC: 13.6 G/DL (ref 11.5–15.5)
IMM GRANULOCYTES # BLD AUTO: <0.03 K/UL (ref 0–0.58)
IMM GRANULOCYTES NFR BLD: 0 % (ref 0–5)
LYMPHOCYTES NFR BLD: 2.53 K/UL (ref 1.5–4)
LYMPHOCYTES RELATIVE PERCENT: 34 % (ref 20–42)
MCH RBC QN AUTO: 29.5 PG (ref 26–35)
MCHC RBC AUTO-ENTMCNC: 34.3 G/DL (ref 32–34.5)
MCV RBC AUTO: 86.1 FL (ref 80–99.9)
MONOCYTES NFR BLD: 0.87 K/UL (ref 0.1–0.95)
MONOCYTES NFR BLD: 12 % (ref 2–12)
NEUTROPHILS NFR BLD: 50 % (ref 43–80)
NEUTS SEG NFR BLD: 3.68 K/UL (ref 1.8–7.3)
PLATELET # BLD AUTO: 236 K/UL (ref 130–450)
PMV BLD AUTO: 10.1 FL (ref 7–12)
POTASSIUM SERPL-SCNC: 4.4 MMOL/L (ref 3.5–5)
PROT SERPL-MCNC: 8.3 G/DL (ref 6.4–8.3)
RBC # BLD AUTO: 4.61 M/UL (ref 3.5–5.5)
SODIUM SERPL-SCNC: 141 MMOL/L (ref 132–146)
WBC OTHER # BLD: 7.4 K/UL (ref 4.5–11.5)

## 2024-09-03 PROCEDURE — 96375 TX/PRO/DX INJ NEW DRUG ADDON: CPT

## 2024-09-03 PROCEDURE — 85025 COMPLETE CBC W/AUTO DIFF WBC: CPT

## 2024-09-03 PROCEDURE — 99285 EMERGENCY DEPT VISIT HI MDM: CPT

## 2024-09-03 PROCEDURE — 96374 THER/PROPH/DIAG INJ IV PUSH: CPT

## 2024-09-03 PROCEDURE — 6360000002 HC RX W HCPCS

## 2024-09-03 PROCEDURE — 80053 COMPREHEN METABOLIC PANEL: CPT

## 2024-09-03 RX ORDER — KETOROLAC TROMETHAMINE 15 MG/ML
15 INJECTION, SOLUTION INTRAMUSCULAR; INTRAVENOUS ONCE
Status: COMPLETED | OUTPATIENT
Start: 2024-09-03 | End: 2024-09-03

## 2024-09-03 RX ORDER — ONDANSETRON 2 MG/ML
4 INJECTION INTRAMUSCULAR; INTRAVENOUS ONCE
Status: COMPLETED | OUTPATIENT
Start: 2024-09-03 | End: 2024-09-03

## 2024-09-03 RX ORDER — 0.9 % SODIUM CHLORIDE 0.9 %
1000 INTRAVENOUS SOLUTION INTRAVENOUS ONCE
Status: COMPLETED | OUTPATIENT
Start: 2024-09-03 | End: 2024-09-04

## 2024-09-03 RX ADMIN — KETOROLAC TROMETHAMINE 15 MG: 15 INJECTION, SOLUTION INTRAMUSCULAR; INTRAVENOUS at 22:46

## 2024-09-03 RX ADMIN — ONDANSETRON 4 MG: 2 INJECTION INTRAMUSCULAR; INTRAVENOUS at 22:47

## 2024-09-03 ASSESSMENT — PAIN DESCRIPTION - LOCATION: LOCATION: FLANK

## 2024-09-03 ASSESSMENT — PAIN DESCRIPTION - DESCRIPTORS: DESCRIPTORS: SHARP

## 2024-09-03 ASSESSMENT — PAIN SCALES - GENERAL: PAINLEVEL_OUTOF10: 10

## 2024-09-03 ASSESSMENT — PAIN DESCRIPTION - ORIENTATION: ORIENTATION: LEFT

## 2024-09-03 NOTE — ED TRIAGE NOTES
Department of Emergency Medicine    FIRST PROVIDER TRIAGE NOTE             Independent MLP           9/3/24  4:12 PM EDT    Date of Encounter: 9/3/24   MRN: [id number]    Vitals:    09/03/24 1614 09/03/24 1615   BP: (!) 194/108    Pulse: 65    Resp: 18    Temp: 98 °F (36.7 °C)    TempSrc: Oral    SpO2: 96%    Weight:  59 kg (130 lb)   Height:  1.549 m (5' 1\")      HPI: Vesna Mark is a 89 y.o. female who presents to the ED for Flank Pain (Left, Patient has been experiencing left lower back pain for the past two weeks. )     States pain is similar to when she was seen in April     No hx kidney stones     ROS: Negative for cp or sob. Denies urinary complaints or visible hematuria     Physical Exam:   Gen Appearance/Constitutional: alert  CV: regular rate  Back: Left CVA tenderness      Initial Plan of Care: All treatment areas with department are currently occupied.     Plan to order/Initiate the following while awaiting opening in ED: Triage evaluation  labs.    Provider-Patient relationship only established for Provider In Triage (PIT).  Full assessment, HPI and examination not performed, therefore, it is not yet possible to state whether or not an emergency medical condition exists    Initial Plan of Care: Initiate Treatment-Testing, Proceed toTreatment Area When Bed Available for ED Attending/MLP to Continue Care  Secondary to high volume, low staffing, and/or boarding- patient to await bed availability.    This ends my PIT-Patient relationship.  Care of patient relinquished after triage    Electronically signed by Ange Kearney PA-C   DD: 9/3/24

## 2024-09-04 ENCOUNTER — APPOINTMENT (OUTPATIENT)
Dept: CT IMAGING | Age: 89
End: 2024-09-04
Payer: MEDICARE

## 2024-09-04 LAB
BILIRUB UR QL STRIP: NEGATIVE
CLARITY UR: CLEAR
COLOR UR: YELLOW
EPI CELLS #/AREA URNS HPF: NORMAL /HPF
GLUCOSE UR STRIP-MCNC: NEGATIVE MG/DL
HGB UR QL STRIP.AUTO: NEGATIVE
KETONES UR STRIP-MCNC: NEGATIVE MG/DL
LEUKOCYTE ESTERASE UR QL STRIP: NEGATIVE
NITRITE UR QL STRIP: NEGATIVE
PH UR STRIP: 6.5 [PH] (ref 5–9)
PROT UR STRIP-MCNC: NEGATIVE MG/DL
RBC #/AREA URNS HPF: NORMAL /HPF
SP GR UR STRIP: 1.01 (ref 1–1.03)
UROBILINOGEN UR STRIP-ACNC: 0.2 EU/DL (ref 0–1)
WBC #/AREA URNS HPF: NORMAL /HPF

## 2024-09-04 PROCEDURE — 81001 URINALYSIS AUTO W/SCOPE: CPT

## 2024-09-04 PROCEDURE — 6360000004 HC RX CONTRAST MEDICATION: Performed by: RADIOLOGY

## 2024-09-04 PROCEDURE — 74177 CT ABD & PELVIS W/CONTRAST: CPT

## 2024-09-04 PROCEDURE — 51702 INSERT TEMP BLADDER CATH: CPT

## 2024-09-04 PROCEDURE — 51798 US URINE CAPACITY MEASURE: CPT

## 2024-09-04 PROCEDURE — 2580000003 HC RX 258

## 2024-09-04 RX ORDER — OXYCODONE AND ACETAMINOPHEN 5; 325 MG/1; MG/1
1 TABLET ORAL EVERY 12 HOURS
Qty: 6 TABLET | Refills: 0 | Status: SHIPPED | OUTPATIENT
Start: 2024-09-04 | End: 2024-09-07

## 2024-09-04 RX ORDER — FENTANYL CITRATE 50 UG/ML
25 INJECTION, SOLUTION INTRAMUSCULAR; INTRAVENOUS ONCE
Status: DISCONTINUED | OUTPATIENT
Start: 2024-09-04 | End: 2024-09-04 | Stop reason: HOSPADM

## 2024-09-04 RX ORDER — POLYETHYLENE GLYCOL 3350 17 G/17G
17 POWDER, FOR SOLUTION ORAL DAILY PRN
Qty: 119 G | Refills: 0 | Status: SHIPPED | OUTPATIENT
Start: 2024-09-04 | End: 2024-09-11

## 2024-09-04 RX ORDER — IOPAMIDOL 755 MG/ML
75 INJECTION, SOLUTION INTRAVASCULAR
Status: COMPLETED | OUTPATIENT
Start: 2024-09-04 | End: 2024-09-04

## 2024-09-04 RX ADMIN — IOPAMIDOL 75 ML: 755 INJECTION, SOLUTION INTRAVENOUS at 00:12

## 2024-09-04 RX ADMIN — SODIUM CHLORIDE 1000 ML: 9 INJECTION, SOLUTION INTRAVENOUS at 00:23

## 2024-09-04 NOTE — ED PROVIDER NOTES
Take 20 mg by mouth daily        ALLERGIES     Prednisone    FAMILYHISTORY     No family history on file.     SOCIAL HISTORY       Social History     Tobacco Use    Smoking status: Never    Smokeless tobacco: Never   Vaping Use    Vaping status: Never Used   Substance Use Topics    Alcohol use: No    Drug use: No       SCREENINGS        [place] Coma Scale  Eye Opening: Spontaneous  Best Verbal Response: Oriented  Best Motor Response: Obeys commands  Елена Coma Scale Score: 15                CIWA Assessment  BP: (!) 194/108  Pulse: 65           PHYSICAL EXAM  1 or more Elements     ED Triage Vitals   BP Systolic BP Percentile Diastolic BP Percentile Temp Temp Source Pulse Respirations SpO2   09/03/24 1614 -- -- 09/03/24 1614 09/03/24 1614 09/03/24 1614 09/03/24 1614 09/03/24 1614   (!) 194/108   98 °F (36.7 °C) Oral 65 18 96 %      Height Weight - Scale         09/03/24 1615 09/03/24 1615         1.549 m (5' 1\") 59 kg (130 lb)                    Constitutional/General: Alert and oriented x3  Head: Normocephalic and atraumatic  Eyes: Conjunctiva normal, sclera non icteric  ENT:  Oropharynx clear, handling secretions, no trismus  Neck: Supple, full ROM, no stridor, no meningeal signs  Respiratory: Lungs clear to auscultation bilaterally, no wheezes, rales, or rhonchi. Not in respiratory distress  Cardiovascular:  Regular rate. Regular rhythm. Equal extremity pulses.   GI:  Abdomen Soft, Non tender, Non distended. No rebound, guarding, or rigidity. CVA tenderness to palpation on the left   Musculoskeletal: Moves all extremities x 4. Warm and well perfused, no clubbing, no cyanosis, no edema.   Integument: skin warm and dry. No rashes.   Neurologic: no focal deficits  Psychiatric: Normal Affect            DIAGNOSTIC RESULTS   LABS:    Labs Reviewed   COMPREHENSIVE METABOLIC PANEL W/ REFLEX TO MG FOR LOW K - Abnormal; Notable for the following components:       Result Value    CO2 21 (*)     Glucose 122 (*)     BUN 24

## 2024-09-04 NOTE — DISCHARGE INSTRUCTIONS
CT ABDOMEN PELVIS W IV CONTRAST Additional Contrast? None   Final Result   No acute intra-abdominal pelvic process appreciated.      Moderate to large colonic stool burden which can be seen with constipation.      Distended urinary bladder.      6 mm right solid pulmonary nodule.  Advise follow-up chest CT within 12   months.      Additional observations as above.

## 2024-09-16 LAB — NON-GYN CYTOLOGY REPORT: NORMAL
